# Patient Record
Sex: FEMALE | Race: WHITE | NOT HISPANIC OR LATINO | Employment: FULL TIME | URBAN - METROPOLITAN AREA
[De-identification: names, ages, dates, MRNs, and addresses within clinical notes are randomized per-mention and may not be internally consistent; named-entity substitution may affect disease eponyms.]

---

## 2017-01-19 ENCOUNTER — OFFICE VISIT (OUTPATIENT)
Dept: FAMILY MEDICINE CLINIC | Facility: CLINIC | Age: 42
End: 2017-01-19

## 2017-01-19 VITALS — HEART RATE: 102 BPM | DIASTOLIC BLOOD PRESSURE: 108 MMHG | SYSTOLIC BLOOD PRESSURE: 160 MMHG | OXYGEN SATURATION: 99 %

## 2017-01-19 DIAGNOSIS — L89.91 PRESSURE ULCER, STAGE I: ICD-10-CM

## 2017-01-19 DIAGNOSIS — L02.31 CUTANEOUS ABSCESS OF BUTTOCK: Primary | ICD-10-CM

## 2017-01-19 DIAGNOSIS — J06.9 VIRAL UPPER RESPIRATORY TRACT INFECTION: ICD-10-CM

## 2017-01-19 PROCEDURE — 99214 OFFICE O/P EST MOD 30 MIN: CPT | Performed by: NURSE PRACTITIONER

## 2017-01-19 RX ORDER — AMOXICILLIN 250 MG/1
250 CAPSULE ORAL 3 TIMES DAILY
COMMUNITY
End: 2017-03-09

## 2017-01-19 RX ORDER — SULFAMETHOXAZOLE AND TRIMETHOPRIM 800; 160 MG/1; MG/1
1 TABLET ORAL 2 TIMES DAILY
Qty: 20 TABLET | Refills: 0 | Status: SHIPPED | OUTPATIENT
Start: 2017-01-19 | End: 2017-02-08

## 2017-01-19 NOTE — PATIENT INSTRUCTIONS
Pressure Injury  A pressure injury, sometimes called a bedsore, is an injury to the skin and underlying tissue caused by pressure. Pressure on blood vessels causes decreased blood flow to the skin, which can eventually cause the skin tissue to die and break down into a wound.  Pressure injuries usually occur:  · Over bony parts of the body such as the tailbone, shoulders, elbows, hips, and heels.  · Under medical devices such as respiratory equipment, stockings, tubes, and splints.  Pressure injuries start as reddened areas on the skin and can lead to pain, muscle damage, and infection. Pressure injuries can vary in severity.   CAUSES  Pressure injuries are caused by a lack of blood supply to an area of skin. They can occur from intense pressure over a short period of time or from less intense pressure over a long period of time.  RISK FACTORS  This condition is more likely to develop in people who:  · Are in the hospital or an extended care facility.  · Are bedridden or in a wheelchair.  · Have an injury or disease that keeps them from:    Moving normally.    Feeling pain or pressure.  · Have a condition that:    Makes them sleepy or less alert.    Causes poor blood flow.  · Need to wear a medical device.  · Have poor control of their bladder or bowel functions (incontinence).  · Have poor nutrition (malnutrition).  · Are of certain ethnicities. People of  and  or  descent are at higher risk compared to other ethnic groups.  If you are at risk for pressure ulcers, your health care provider may recommend certain types of bedding to help prevent them. These may include foam or gel mattresses covered with one of the following:  · A sheepskin blanket.  · A pad that is filled with gel, air, water, or foam.  SYMPTOMS   The main symptom is a blister or change in skin color that opens into a wound. Other symptoms include:   · Red or dark areas of skin that do not turn white or pale when  pressed with a finger.    · Pain, warmth, or change of skin texture.    DIAGNOSIS  This condition is diagnosed with a medical history and physical exam. You may also have tests, including:   · Blood tests to check for infection or signs of poor nutrition.  · Imaging studies to check for damage to the deep tissues under your skin.  · Blood flow studies.  Your pressure injury will be staged to determine its severity. Staging is an assessment of:  · The depth of the pressure injury.  · Which tissues are exposed because of the pressure injury.  · The causes of the pressure injury.   TREATMENT  The main focus of treatment is to help your injury heal. This may be done by:   · Relieving or redistributing pressure on your skin. This includes:    Frequently changing your position.    Eliminating or minimizing positions that caused the wound or that can make the wound worse.    Using specific bed mattresses and chair cushions.    Refitting, resizing, or replacing any medical devices, or padding the skin under them.    Using creams or powders to prevent rubbing (friction) on the skin.  · Keeping your skin clean and dry. This may include using a skin cleanser or skin protectant as told by your health care provider. This may be a lotion, ointment, or spray.  · Cleaning your injury and removing any dead tissue from the wound (debridement).  · Placing a bandage (dressing) over your injury.  · Preventing or treating infection. This may include antibiotic, antimicrobial, or antiseptic medicines.  Treatment may also include medicine for pain.  Sometimes surgery is needed to close the wound with a flap of healthy skin or a piece of skin from another area of your body (graft). You may need surgery if other treatments are not working or if your injury is very deep.  HOME CARE INSTRUCTIONS  Wound Care  · Follow instructions from your health care provider about:    How to take care of your wound.    When and how you should change your  dressing.    When you should remove your dressing. If your dressing is dry and stuck when you try to remove it, moisten or wet the dressing with saline or water so that it can be removed without harming your skin or wound tissue.  · Check your wound every day for signs of infection. Have a caregiver do this for you if you are not able. Watch for:    More redness, swelling, or pain.    More fluid, blood, or pus.    A bad smell.  Skin Care  · Keep your skin clean and dry. Gently pat your skin dry.  · Do not rub or massage your skin.  · Use a skin protectant only as told by your health care provider.  · Check your skin every day for any changes in color or any new blisters or sores (ulcers). Have a caregiver do this for you if you are not able.  Medicines   · Take over-the-counter and prescription medicines only as told by your health care provider.  · If you were prescribed an antibiotic medicine, take it or apply it as told by your health care provider. Do not stop taking or using the antibiotic even if your condition improves.  Reducing and Redistributing Pressure  · Do not lie or sit in one position for a long time. Move or change position every two hours or as told by your health care provider.  · Use pillows or cushions to reduce pressure. Ask your health care provider to recommend cushions or pads for you.  · Use medical devices that do not rub your skin. Tell your health care provider if one of your medical devices is causing a pressure injury to develop.  General Instructions  · Eat a healthy diet that includes lots of protein. Ask your health care provider for diet advice.  · Drink enough fluid to keep your urine clear or pale yellow.  · Be as active as you can every day. Ask your health care provider to suggest safe exercises or activities.  · Do not abuse drugs or alcohol.  · Keep all follow-up visits as told by your health care provider. This is important.  · Do not smoke.  SEEK MEDICAL CARE IF:  · You  have chills or fever.  · Your pain medicine is not helping.  · You have any changes in skin color.  · You have new blisters or sores.  · You develop warmth, redness, or swelling near a pressure injury.  · You have a bad odor or pus coming from your pressure injury.  · You lose control of your bowels or bladder.  · You develop new symptoms.  · Your wound does not improve after 1-2 weeks of treatment.  · You develop a new medical condition, such as diabetes, peripheral vascular disease, or conditions that affect your defense (immune) system.     This information is not intended to replace advice given to you by your health care provider. Make sure you discuss any questions you have with your health care provider.     Document Released: 12/18/2006 Document Revised: 09/07/2016 Document Reviewed: 04/27/2016  [employer] Interactive Patient Education ©2016 [employer]

## 2017-01-19 NOTE — MR AVS SNAPSHOT
Rehana Cruz   1/19/2017 11:30 AM   Office Visit    Provider:  LEFTY Bustos   Department:  [facility]   Dept Phone:  [phone]                Your Full Care Plan              Today's Medication Changes          These changes are accurate as of: 1/19/17 11:43 AM.  If you have any questions, ask your nurse or doctor.               New Medication(s)Ordered:     sulfamethoxazole-trimethoprim 800-160 MG per tablet   Commonly known as:  BACTRIM DS,SEPTRA DS   Take 1 tablet by mouth 2 (Two) Times a Day.   Started by:  LEFTY Bustos         Medication(s)that have changed:     amoxicillin 250 MG capsule   Commonly known as:  AMOXIL   Take 250 mg by mouth 3 (Three) Times a Day.   What changed:  Another medication with the same name was removed. Continue taking this medication, and follow the directions you see here.   Changed by:  LEFTY Bustos            Where to Get Your Medications      These medications were sent to [facility] [address] [place] [phone] [place] [id number] [address] [phone]     Phone:  [phone]     sulfamethoxazole-trimethoprim 800-160 MG per tablet                  Your Updated Medication List          This list is accurate as of: 1/19/17 11:43 AM.  Always use your most recent med list.                amoxicillin 250 MG capsule   Commonly known as:  AMOXIL       norethindrone-ethinyl estradiol 1-20 MG-MCG per tablet   Commonly known as:  MICROGESTIN   Take 1 tablet by mouth daily.       oxybutynin XL 15 MG 24 hr tablet   Commonly known as:  DITROPAN XL       pimecrolimus 1 % cream   Commonly known as:  ELIDEL       sulfamethoxazole-trimethoprim 800-160 MG per tablet   Commonly known as:  BACTRIM DS,SEPTRA DS   Take 1 tablet by mouth 2 (Two) Times a Day.       venlafaxine  MG 24 hr capsule   Commonly known as:   EFFEXOR-XR   TAKE 1 CAPSULE BY MOUTH EVERY DAY               You Were Diagnosed With        Codes Comments    Cutaneous abscess of buttock    -  Primary ICD-10-CM: L02.31  ICD-9-CM: 682.5       Instructions     None    Patient Instructions History      [employer] Signup     [facility] [email or web address] allows you to send messages to your doctor, view your test results, renew your prescriptions, schedule appointments, and more. To sign up, go to [employer] and click on the Sign Up Now link in the New User? box. Enter your [email or web address] Activation Code exactly as it appears below along with the last four digits of your Social Security Number and your Date of Birth () to complete the sign-up process. If you do not sign up before the expiration date, you must request a new code.    [email or web address] Activation Code: [email or web address]  Expires: 2017 11:42 AM    If you have questions, you can email [email or web address] or call [phone] to talk to our [email or web address] staff. Remember, [email or web address] is NOT to be used for urgent needs. For medical emergencies, dial 911.               Other Info from Your Visit           Allergies     Latex  Rash      Reason for Visit     Wound Check     Nasal Congestion           Vital Signs     Blood Pressure Pulse Oxygen Saturation Smoking Status          160/108 102 99% Former Smoker        Problems and Diagnoses Noted     Abscess of buttock    -  Primary

## 2017-01-19 NOTE — PROGRESS NOTES
Subjective   Rehana Cruz is a 41 y.o. female.     History of Present Illness Patient presents with chest with nasal congestion that has been present for about 2 weeks. Pt has a hx of spina bifida and is a wheelchair.     Patient also states that she has a pressure sore on her buttocks that has been present for about a month. She states that she has some drainage. The area is getting smaller. Using Hibiclens.    The following portions of the patient's history were reviewed and updated as appropriate: allergies, current medications, past family history, past medical history, past social history, past surgical history and problem list.    Review of Systems   Respiratory: Positive for cough.        Objective   Physical Exam   Constitutional: She is oriented to person, place, and time. She appears well-developed and well-nourished. No distress.   HENT:   Head: Normocephalic and atraumatic.   Right Ear: External ear normal.   Left Ear: External ear normal.   Nose: Nose normal.   Mouth/Throat: Oropharynx is clear and moist. No oropharyngeal exudate.   Eyes: Conjunctivae and EOM are normal. Pupils are equal, round, and reactive to light.   Neck: Normal range of motion.   Cardiovascular: Normal rate and regular rhythm.    Pulmonary/Chest: Effort normal and breath sounds normal. No stridor. No respiratory distress. She has no wheezes.   Lymphadenopathy:     She has no cervical adenopathy.   Neurological: She is alert and oriented to person, place, and time.   Skin: Skin is warm and dry. Rash noted. There is erythema.   1cm erythematous pressure ulcer to left gluteal fold no drainage   Psychiatric: She has a normal mood and affect. Her behavior is normal.   Nursing note and vitals reviewed.      Assessment/Plan   Rehana was seen today for wound check and nasal congestion.    Diagnoses and all orders for this visit:    Cutaneous abscess of buttock  -     sulfamethoxazole-trimethoprim (BACTRIM DS,SEPTRA DS) 800-160 MG per  tablet; Take 1 tablet by mouth 2 (Two) Times a Day.    Viral upper respiratory tract infection    Pressure ulcer, stage I    Need to follow up for blood pressure monitoring

## 2017-01-22 ENCOUNTER — HOSPITAL ENCOUNTER (EMERGENCY)
Facility: HOSPITAL | Age: 42
Discharge: HOME | End: 2017-01-22
Attending: EMERGENCY MEDICINE | Admitting: EMERGENCY MEDICINE

## 2017-01-22 VITALS
OXYGEN SATURATION: 97 % | TEMPERATURE: 97.1 F | RESPIRATION RATE: 18 BRPM | HEART RATE: 89 BPM | SYSTOLIC BLOOD PRESSURE: 158 MMHG | HEIGHT: 60 IN | WEIGHT: 205 LBS | DIASTOLIC BLOOD PRESSURE: 71 MMHG | BODY MASS INDEX: 40.25 KG/M2

## 2017-01-22 DIAGNOSIS — J40 BRONCHITIS: Primary | ICD-10-CM

## 2017-01-22 DIAGNOSIS — I10 ESSENTIAL HYPERTENSION: ICD-10-CM

## 2017-01-22 PROCEDURE — 94760 N-INVAS EAR/PLS OXIMETRY 1: CPT

## 2017-01-22 PROCEDURE — 99283 EMERGENCY DEPT VISIT LOW MDM: CPT

## 2017-01-22 PROCEDURE — 94640 AIRWAY INHALATION TREATMENT: CPT

## 2017-01-22 PROCEDURE — 94799 UNLISTED PULMONARY SVC/PX: CPT

## 2017-01-22 RX ORDER — DOXYCYCLINE 100 MG/1
100 CAPSULE ORAL 2 TIMES DAILY
Qty: 20 CAPSULE | Refills: 0 | Status: SHIPPED | OUTPATIENT
Start: 2017-01-22 | End: 2017-03-09

## 2017-01-22 RX ORDER — IPRATROPIUM BROMIDE AND ALBUTEROL SULFATE 2.5; .5 MG/3ML; MG/3ML
3 SOLUTION RESPIRATORY (INHALATION) ONCE
Status: COMPLETED | OUTPATIENT
Start: 2017-01-22 | End: 2017-01-22

## 2017-01-22 RX ORDER — ALBUTEROL SULFATE 90 UG/1
2 AEROSOL, METERED RESPIRATORY (INHALATION) EVERY 4 HOURS PRN
Qty: 1 INHALER | Refills: 0 | Status: SHIPPED | OUTPATIENT
Start: 2017-01-22 | End: 2018-05-29

## 2017-01-22 RX ADMIN — IPRATROPIUM BROMIDE AND ALBUTEROL SULFATE 3 ML: .5; 3 SOLUTION RESPIRATORY (INHALATION) at 13:56

## 2017-01-22 NOTE — ED PROVIDER NOTES
" EMERGENCY DEPARTMENT ENCOUNTER    CHIEF COMPLAINT  Chief Complaint: URI Symptoms  History given by: Patient  History limited by: N/A  Room Number: 17/17  PMD: Rogelio De La Rosa MD      HPI:  Pt is a 41 y.o. female who presents complaining of URI symptoms. Pt was seen this morning at the [facility] when she experienced hypertension and was sent here for further evaluation. Pt states that her BP is normally around 160/80. Pt reports a cough productive of green sputum and SOA onset yesterday morning, nasal and chest congestion onset 2 weeks ago, mild rhinorrhea, and a sore throat secondary to coughing. Pt denies a fever, CP, abdominal pain, N/V/D, dysuria, and leg pain/swelling. Pt reports a hx of pneumonia, hypertension, and MRSA.    Duration:  2 weeks  Onset: Gradual  Timing: Constant  Location: N/A  Radiation: N/A  Quality: \"URI\"  Intensity/Severity: Moderate  Progression: Worsening  Associated Symptoms: Cough productive of green sputum, SOA, nasal and chest congestion, mild rhinorrhea, ans a sore throat  Aggravating Factors: None  Alleviating Factors: None  Previous Episodes: None  Treatment before arrival: None    PAST MEDICAL HISTORY  Active Ambulatory Problems     Diagnosis Date Noted   • Spina bifida    • Depression    • Neurogenic bladder    • Hemochromatosis      Resolved Ambulatory Problems     Diagnosis Date Noted   • No Resolved Ambulatory Problems     No Additional Past Medical History       PAST SURGICAL HISTORY  Past Surgical History   Procedure Laterality Date   • Skin graft Right      Buttock       FAMILY HISTORY  Family History   Problem Relation Age of Onset   • Ovarian cancer Mother      50's   • Multiple sclerosis Father    • Leukemia Paternal Uncle    • Alzheimer's disease Maternal Grandmother    • Leukemia Maternal Grandfather    • Hemochromatosis Brother        SOCIAL HISTORY  Social History     Social History   • Marital status: Single     Spouse name: N/A   • Number of children: N/A   • Years of " education: N/A     Occupational History   • Not on file.     Social History Main Topics   • Smoking status: Former Smoker     Packs/day: 0.50     Years: 20.00     Types: Cigarettes   • Smokeless tobacco: Never Used   • Alcohol use Yes      Comment: Socially.   • Drug use: No   • Sexual activity: No     Other Topics Concern   • Not on file     Social History Narrative       ALLERGIES  Latex    REVIEW OF SYSTEMS  Review of Systems   Constitutional: Negative.  Negative for unexpected weight change.   HENT: Positive for congestion (nasal and chest), rhinorrhea (mild) and sore throat (secondary to coughing).    Respiratory: Positive for cough (productive of green sputum) and shortness of breath.    Cardiovascular: Negative.    Gastrointestinal: Negative.    Genitourinary: Negative.    All other systems reviewed and are negative.      PHYSICAL EXAM  ED Triage Vitals   Temp Heart Rate Resp BP SpO2   01/22/17 1050 01/22/17 1050 01/22/17 1050 01/22/17 1106 01/22/17 1050   96.4 °F (35.8 °C) 93 18 160/92 94 %      Temp src Heart Rate Source Patient Position BP Location FiO2 (%)   01/22/17 1050 01/22/17 1050 -- 01/22/17 1106 --   Tympanic Monitor  Right arm        Physical Exam   Constitutional: She is oriented to person, place, and time and well-developed, well-nourished, and in no distress.   Eyes: EOM are normal.   Neck: Normal range of motion.   Cardiovascular: Normal rate and regular rhythm.    Pulmonary/Chest: Effort normal. No respiratory distress. She has wheezes (mild bilateral expiratory).   Neurological: She is alert and oriented to person, place, and time. She has normal sensation and normal strength.   Skin: Skin is warm and dry.   Psychiatric: Affect normal.   Nursing note and vitals reviewed.      LAB RESULTS  Lab Results (last 24 hours)     ** No results found for the last 24 hours. **          RADIOLOGY  No orders to display          PROCEDURES   Procedures      PROGRESS AND CONSULTS  ED Course     1:18  PM:  Vitals: BP: 162/82 HR: 93 Temp: 96.4 °F (35.8 °C) (Tympanic) O2 sat: 95%  Discussed with pt plan for Doxycycline and an albuterol inhaler. Pt will be discharged. Pt understands and agrees with the plan, all questions answered.      MEDICAL DECISION MAKING  Results were reviewed/discussed with the patient and they were also made aware of online access. Pt also made aware that some labs, such as cultures, will not be resulted during ER visit and follow up with PMD is necessary.     MDM       DIAGNOSIS  Final diagnoses:   Bronchitis   Essential hypertension       DISPOSITION  1328- DISCHARGE    Patient discharged in stable condition.    Reviewed implications of results, diagnosis, meds, responsibility to follow up, warning signs and symptoms of possible worsening, potential complications and reasons to return to ER.    Patient/Family voiced understanding of above instructions.    Discussed plan for discharge, as there is no emergent indication for admission.  Pt/family is agreeable and understands need for follow up and repeat testing.  Pt is aware that discharge does not mean that nothing is wrong but it indicates no emergency is present that requires admission and they must continue care with follow-up as given below or physician of their choice.     FOLLOW-UP  Rogelio De La Rosa MD  [address] [place] [id number] [phone]    In 1 week  If Not Better         Medication List      New Prescriptions          albuterol 108 (90 BASE) MCG/ACT inhaler   Commonly known as:  PROVENTIL HFA;VENTOLIN HFA   Inhale 2 puffs Every 4 (Four) Hours As Needed for wheezing.       doxycycline 100 MG capsule   Commonly known as:  MONODOX   Take 1 capsule by mouth 2 (Two) Times a Day.         Stop          amoxicillin 250 MG capsule   Commonly known as:  AMOXIL       sulfamethoxazole-trimethoprim 800-160 MG per tablet   Commonly known as:  BACTRIM DS,SEPTRA DS           Latest Documented Vital Signs:  As of 1:28 PM  BP-  162/82 HR- 93 Temp- 96.4 °F (35.8 °C) (Tympanic) O2 sat- 96%    --  Documentation assistance provided by joe Story for Indra Camargo MD.  Information recorded by the joe was done at my direction and has been verified and validated by me.       Hamilton Story  01/22/17 6488       Indra Camargo MD  01/22/17 3316

## 2017-01-24 ENCOUNTER — DOCUMENTATION (OUTPATIENT)
Dept: SOCIAL WORK | Facility: HOSPITAL | Age: 42
End: 2017-01-24

## 2017-02-08 ENCOUNTER — OFFICE VISIT (OUTPATIENT)
Dept: FAMILY MEDICINE CLINIC | Facility: CLINIC | Age: 42
End: 2017-02-08

## 2017-02-08 VITALS
OXYGEN SATURATION: 95 % | BODY MASS INDEX: 40.25 KG/M2 | WEIGHT: 205 LBS | HEART RATE: 94 BPM | RESPIRATION RATE: 20 BRPM | SYSTOLIC BLOOD PRESSURE: 140 MMHG | HEIGHT: 60 IN | DIASTOLIC BLOOD PRESSURE: 86 MMHG

## 2017-02-08 DIAGNOSIS — R06.2 WHEEZING: Primary | ICD-10-CM

## 2017-02-08 DIAGNOSIS — R05.9 COUGHING: ICD-10-CM

## 2017-02-08 PROCEDURE — 99213 OFFICE O/P EST LOW 20 MIN: CPT | Performed by: NURSE PRACTITIONER

## 2017-02-08 RX ORDER — METHYLPREDNISOLONE 4 MG/1
TABLET ORAL
Qty: 21 TABLET | Refills: 0 | Status: SHIPPED | OUTPATIENT
Start: 2017-02-08 | End: 2017-03-09

## 2017-02-08 RX ORDER — FLUTICASONE PROPIONATE 50 MCG
1 SPRAY, SUSPENSION (ML) NASAL
COMMUNITY
End: 2018-11-29 | Stop reason: SDUPTHER

## 2017-02-08 NOTE — PROGRESS NOTES
"Subjective   Rehana Cruz is a 41 y.o. female.     Chief Complaint   Patient presents with   • URI     x 2 weeks ago. Went to [facility] and dx with bronchitis.    • Shortness of Breath     Is still having all symptoms    • Cough     productive    • Nasal Congestion     States ears are clogged up     Social History   Substance Use Topics   • Smoking status: Former Smoker     Packs/day: 0.50     Years: 20.00     Types: Cigarettes   • Smokeless tobacco: Never Used   • Alcohol use Yes      Comment: Socially.       History of Present Illness     The following portions of the patient's history were reviewed and updated as appropriate: allergies, current medications, past social history and problem list.  Review of Systems   Constitutional: Negative for activity change and appetite change.   HENT: Positive for congestion and sinus pressure.    Respiratory: Positive for cough, shortness of breath and wheezing.    All other systems reviewed and are negative.      Objective   Vitals:    02/08/17 0941   BP: 140/86   Pulse: 94   Resp: 20   SpO2: 95%   Weight: 205 lb (93 kg)   Height: 60\" (152.4 cm)     Body mass index is 40.04 kg/(m^2).    Physical Exam   Constitutional: She appears well-developed and well-nourished. No distress.   HENT:   Head: Normocephalic.   Right Ear: External ear normal.   Left Ear: External ear normal.   Mouth/Throat: Oropharynx is clear and moist.   Eyes: EOM are normal.   Neck: Neck supple.   Cardiovascular: Normal rate and regular rhythm.    Pulmonary/Chest: Effort normal. She has wheezes.   Inspiratory and expiratory wheezes all fields   Neurological: She is alert.   Nursing note and vitals reviewed.      Assessment/Plan   Problem List Items Addressed This Visit     None      Visit Diagnoses     Wheezing    -  Primary    Coughing             Medrol dose pack as written, use nebulizer 4-6 puffs every 4 hours for the next 24 hours  "

## 2017-03-09 ENCOUNTER — OFFICE VISIT (OUTPATIENT)
Dept: FAMILY MEDICINE CLINIC | Facility: CLINIC | Age: 42
End: 2017-03-09

## 2017-03-09 VITALS
HEIGHT: 60 IN | HEART RATE: 82 BPM | OXYGEN SATURATION: 98 % | DIASTOLIC BLOOD PRESSURE: 80 MMHG | SYSTOLIC BLOOD PRESSURE: 145 MMHG

## 2017-03-09 DIAGNOSIS — I10 ESSENTIAL HYPERTENSION: ICD-10-CM

## 2017-03-09 DIAGNOSIS — I10 ESSENTIAL HYPERTENSION: Primary | ICD-10-CM

## 2017-03-09 PROCEDURE — 99213 OFFICE O/P EST LOW 20 MIN: CPT | Performed by: FAMILY MEDICINE

## 2017-03-09 RX ORDER — AMOXICILLIN 250 MG/1
250 CAPSULE ORAL DAILY
COMMUNITY
End: 2017-05-17

## 2017-03-09 RX ORDER — INDAPAMIDE 2.5 MG/1
2.5 TABLET, FILM COATED ORAL EVERY MORNING
Qty: 30 TABLET | Refills: 6 | Status: SHIPPED | OUTPATIENT
Start: 2017-03-09 | End: 2017-03-09 | Stop reason: SDUPTHER

## 2017-03-09 NOTE — PROGRESS NOTES
Subjective   Rehana Cruz is a 41 y.o. female here to discuss elevated BP.    History of Present Illness   Rheana is concerned her birth control is effecting her BP. She has been monitoring her BP on occasion; averaging 150/80-90's. She denies CP, SOB, vision disturbances, or HA..  She feels that she needs the OCP because of a hx of heavy menses so she would like to stay on this and treat the HTN.    The following portions of the patient's history were reviewed and updated as appropriate: allergies, current medications, past medical history and past social history.    Review of Systems   Respiratory: Negative for chest tightness and shortness of breath.    Cardiovascular: Positive for leg swelling. Negative for chest pain and palpitations.   Neurological: Negative for dizziness and headaches.       Objective   Physical Exam   Constitutional: She is oriented to person, place, and time.   Cardiovascular: Normal rate and regular rhythm.    Pulmonary/Chest: Effort normal and breath sounds normal.   Neurological: She is alert and oriented to person, place, and time.   Skin: No rash noted.   Nursing note and vitals reviewed.      Assessment/Plan   Rehana was seen today for hypertension.    Diagnoses and all orders for this visit:    Essential hypertension  -     indapamide (LOZOL) 2.5 MG tablet; Take 1 tablet by mouth Every Morning.    She will f/u with me in 2 weeks for repeat B/P check and CMP.   I have also advised a daily baby aspirin.

## 2017-03-09 NOTE — PATIENT INSTRUCTIONS
I have started you indapamide 2.5 mg to see if your blood pressure responds well to this.  I would try a daily baby aspirin.

## 2017-03-10 RX ORDER — INDAPAMIDE 2.5 MG/1
TABLET, FILM COATED ORAL
Qty: 90 TABLET | Refills: 1 | Status: SHIPPED | OUTPATIENT
Start: 2017-03-10 | End: 2017-08-04 | Stop reason: SDUPTHER

## 2017-03-22 ENCOUNTER — OFFICE VISIT (OUTPATIENT)
Dept: FAMILY MEDICINE CLINIC | Facility: CLINIC | Age: 42
End: 2017-03-22

## 2017-03-22 VITALS — SYSTOLIC BLOOD PRESSURE: 145 MMHG | DIASTOLIC BLOOD PRESSURE: 80 MMHG | HEART RATE: 90 BPM | OXYGEN SATURATION: 98 %

## 2017-03-22 DIAGNOSIS — F41.9 ANXIETY: ICD-10-CM

## 2017-03-22 DIAGNOSIS — I10 ESSENTIAL HYPERTENSION: ICD-10-CM

## 2017-03-22 DIAGNOSIS — F32.89 OTHER DEPRESSION: ICD-10-CM

## 2017-03-22 DIAGNOSIS — I10 ESSENTIAL HYPERTENSION: Primary | ICD-10-CM

## 2017-03-22 PROCEDURE — 99213 OFFICE O/P EST LOW 20 MIN: CPT | Performed by: FAMILY MEDICINE

## 2017-03-22 RX ORDER — VENLAFAXINE 75 MG/1
150 TABLET ORAL 2 TIMES DAILY
Qty: 120 TABLET | Refills: 3 | Status: SHIPPED | OUTPATIENT
Start: 2017-03-22 | End: 2017-08-04 | Stop reason: SDUPTHER

## 2017-03-22 RX ORDER — AMLODIPINE BESYLATE 5 MG/1
5 TABLET ORAL DAILY
Qty: 30 TABLET | Refills: 3 | Status: SHIPPED | OUTPATIENT
Start: 2017-03-22 | End: 2017-03-22 | Stop reason: SDUPTHER

## 2017-03-22 RX ORDER — AMLODIPINE BESYLATE 5 MG/1
TABLET ORAL
Qty: 90 TABLET | Refills: 0 | Status: SHIPPED | OUTPATIENT
Start: 2017-03-22 | End: 2017-06-29 | Stop reason: SDUPTHER

## 2017-03-22 NOTE — PROGRESS NOTES
Subjective   Rehana Cruz is a 41 y.o. female here for 2wk. re-evaluation for HTN.    History of Present Illness   She has been taking indapamide as prescribed with no adverse effects.  She has npo c/o headache or leg swelling. She has not increased her activity much.  She would also like to discuss her antidepressant. It works well for her but her current generic fore is very expensive and she would like to try the immediate release form to see if that works because it is much less expensive.    The following portions of the patient's history were reviewed and updated as appropriate: allergies, current medications, past family history, past medical history, past social history, past surgical history and problem list.    Review of Systems   Respiratory: Negative for chest tightness and shortness of breath.    Cardiovascular: Negative for chest pain and palpitations.       Objective   Physical Exam   Constitutional: She appears well-developed.   In a wheelchair.   Cardiovascular: Normal rate and regular rhythm.    Pulmonary/Chest: Breath sounds normal.   Psychiatric: She has a normal mood and affect. Her behavior is normal. Judgment and thought content normal.   Nursing note and vitals reviewed.      Assessment/Plan   Rehana was seen today for hypertension.    Diagnoses and all orders for this visit:    Essential hypertension  -    I have added Norvasc to indapamide to see if this helps  ': amLODIPine (NORVASC) 5 MG tablet; Take 1 tablet by mouth Daily.  She will RTO in 2 weeks for a B/P check.  Anxiety  Other depression  I have changed to short release and she will see if this works for her.  -     venlafaxine (EFFEXOR) 75 MG tablet; Take 2 tablets by mouth 2 (Two) Times a Day.

## 2017-04-18 ENCOUNTER — OFFICE VISIT (OUTPATIENT)
Dept: FAMILY MEDICINE CLINIC | Facility: CLINIC | Age: 42
End: 2017-04-18

## 2017-04-18 VITALS — OXYGEN SATURATION: 98 % | SYSTOLIC BLOOD PRESSURE: 115 MMHG | DIASTOLIC BLOOD PRESSURE: 70 MMHG | HEART RATE: 72 BPM

## 2017-04-18 DIAGNOSIS — R35.0 URINARY FREQUENCY: ICD-10-CM

## 2017-04-18 DIAGNOSIS — I10 ESSENTIAL HYPERTENSION: ICD-10-CM

## 2017-04-18 DIAGNOSIS — R82.90 ABNORMAL URINE ODOR: Primary | ICD-10-CM

## 2017-04-18 LAB
BILIRUB BLD-MCNC: NEGATIVE MG/DL
CLARITY, POC: CLEAR
COLOR UR: YELLOW
GLUCOSE UR STRIP-MCNC: NEGATIVE MG/DL
KETONES UR QL: ABNORMAL
LEUKOCYTE EST, POC: NEGATIVE
NITRITE UR-MCNC: NEGATIVE MG/ML
PH UR: 7 [PH] (ref 5–8)
PROT UR STRIP-MCNC: ABNORMAL MG/DL
RBC # UR STRIP: NEGATIVE /UL
SP GR UR: 1 (ref 1–1.03)
UROBILINOGEN UR QL: NORMAL

## 2017-04-18 PROCEDURE — 81002 URINALYSIS NONAUTO W/O SCOPE: CPT | Performed by: FAMILY MEDICINE

## 2017-04-18 PROCEDURE — 99213 OFFICE O/P EST LOW 20 MIN: CPT | Performed by: FAMILY MEDICINE

## 2017-04-18 NOTE — PROGRESS NOTES
Subjective   Rehana Cruz is a 41 y.o. female here to re-evaluate HTN and UTI.    History of Present Illness   Rehana has a hx of chronic hypertension that was not well controlled until recently.  She has been taking amlodipine and indapamide and she is tolerating well.  She has no side effecst. She is feeling well.  She has had a few days of pelvic discomfort and has a hx of frequent UTIs. She is concerned about the odor.She has spina bifida and this increasing her risk for UTI.  She has not noticed frequency or blood.   The following portions of the patient's history were reviewed and updated as appropriate: allergies, current medications, past medical history and past social history.    Review of Systems   Gastrointestinal: Positive for abdominal pain.   Genitourinary: Positive for dysuria, frequency and urgency.       Objective   Physical Exam   Constitutional: She appears well-developed.   HENT:   Head: Normocephalic and atraumatic.   Eyes: EOM are normal. Pupils are equal, round, and reactive to light.   Cardiovascular: Normal rate and regular rhythm.    Pulmonary/Chest: Effort normal.   Abdominal: Soft. She exhibits no distension.   Neurological: She is alert.   Vitals reviewed.      Assessment/Plan   Rehana was seen today for hypertension and urinary tract infection.    Diagnoses and all orders for this visit:  Urinary Frequency  Abnormal urine odor  -     POCT urinalysis dipstick, manual, this was negative and I have advised monitoring for now.    Essential hypertension  Well controlled on current medication.  She will f/u with me in a month or so for a preventive exam.

## 2017-05-03 ENCOUNTER — TELEPHONE (OUTPATIENT)
Dept: FAMILY MEDICINE CLINIC | Facility: CLINIC | Age: 42
End: 2017-05-03

## 2017-05-17 ENCOUNTER — OFFICE VISIT (OUTPATIENT)
Dept: FAMILY MEDICINE CLINIC | Facility: CLINIC | Age: 42
End: 2017-05-17

## 2017-05-17 VITALS
HEART RATE: 89 BPM | HEIGHT: 61 IN | RESPIRATION RATE: 16 BRPM | SYSTOLIC BLOOD PRESSURE: 120 MMHG | DIASTOLIC BLOOD PRESSURE: 74 MMHG | OXYGEN SATURATION: 98 %

## 2017-05-17 DIAGNOSIS — F32.89 OTHER DEPRESSION: ICD-10-CM

## 2017-05-17 DIAGNOSIS — E83.110 HEREDITARY HEMOCHROMATOSIS (HCC): ICD-10-CM

## 2017-05-17 DIAGNOSIS — Q05.7 SPINA BIFIDA OF LUMBOSACRAL REGION WITHOUT HYDROCEPHALUS (HCC): ICD-10-CM

## 2017-05-17 DIAGNOSIS — B35.1 TOENAIL FUNGUS: ICD-10-CM

## 2017-05-17 DIAGNOSIS — I10 ESSENTIAL HYPERTENSION: ICD-10-CM

## 2017-05-17 DIAGNOSIS — Z00.00 ROUTINE GENERAL MEDICAL EXAMINATION AT A HEALTH CARE FACILITY: Primary | ICD-10-CM

## 2017-05-17 LAB
ALBUMIN SERPL-MCNC: 4.2 G/DL (ref 3.5–5.2)
ALBUMIN/GLOB SERPL: 1.4 G/DL
ALP SERPL-CCNC: 96 U/L (ref 39–117)
ALT SERPL-CCNC: 25 U/L (ref 1–33)
AST SERPL-CCNC: 20 U/L (ref 1–32)
BILIRUB SERPL-MCNC: 0.3 MG/DL (ref 0.1–1.2)
BUN SERPL-MCNC: 17 MG/DL (ref 6–20)
BUN/CREAT SERPL: 39.5 (ref 7–25)
CALCIUM SERPL-MCNC: 10 MG/DL (ref 8.6–10.5)
CHLORIDE SERPL-SCNC: 95 MMOL/L (ref 98–107)
CHOLEST SERPL-MCNC: 258 MG/DL (ref 0–200)
CHOLEST/HDLC SERPL: 4.61 {RATIO}
CO2 SERPL-SCNC: 28.1 MMOL/L (ref 22–29)
CREAT SERPL-MCNC: 0.43 MG/DL (ref 0.57–1)
ERYTHROCYTE [DISTWIDTH] IN BLOOD BY AUTOMATED COUNT: 12.6 % (ref 11.7–13)
GLOBULIN SER CALC-MCNC: 3.1 GM/DL
GLUCOSE SERPL-MCNC: 97 MG/DL (ref 65–99)
HCT VFR BLD AUTO: 41.1 % (ref 35.6–45.5)
HDLC SERPL-MCNC: 56 MG/DL (ref 40–60)
HGB BLD-MCNC: 14.3 G/DL (ref 11.9–15.5)
LDLC SERPL CALC-MCNC: 172 MG/DL (ref 0–100)
MCH RBC QN AUTO: 31.3 PG (ref 26.9–32)
MCHC RBC AUTO-ENTMCNC: 34.8 G/DL (ref 32.4–36.3)
MCV RBC AUTO: 89.9 FL (ref 80.5–98.2)
PLATELET # BLD AUTO: 356 10*3/MM3 (ref 140–500)
POTASSIUM SERPL-SCNC: 3.2 MMOL/L (ref 3.5–5.2)
PROT SERPL-MCNC: 7.3 G/DL (ref 6–8.5)
RBC # BLD AUTO: 4.57 10*6/MM3 (ref 3.9–5.2)
SODIUM SERPL-SCNC: 141 MMOL/L (ref 136–145)
TRIGL SERPL-MCNC: 150 MG/DL (ref 0–150)
VLDLC SERPL CALC-MCNC: 30 MG/DL (ref 5–40)
WBC # BLD AUTO: 13.19 10*3/MM3 (ref 4.5–10.7)

## 2017-05-17 PROCEDURE — 96160 PT-FOCUSED HLTH RISK ASSMT: CPT | Performed by: FAMILY MEDICINE

## 2017-05-17 PROCEDURE — 99396 PREV VISIT EST AGE 40-64: CPT | Performed by: FAMILY MEDICINE

## 2017-05-17 PROCEDURE — G0439 PPPS, SUBSEQ VISIT: HCPCS | Performed by: FAMILY MEDICINE

## 2017-05-17 PROCEDURE — 99213 OFFICE O/P EST LOW 20 MIN: CPT | Performed by: FAMILY MEDICINE

## 2017-05-24 DIAGNOSIS — E87.6 LOW BLOOD POTASSIUM: ICD-10-CM

## 2017-05-24 DIAGNOSIS — D72.829 LEUKOCYTOSIS, UNSPECIFIED TYPE: Primary | ICD-10-CM

## 2017-05-29 ENCOUNTER — RESULTS ENCOUNTER (OUTPATIENT)
Dept: FAMILY MEDICINE CLINIC | Facility: CLINIC | Age: 42
End: 2017-05-29

## 2017-05-29 DIAGNOSIS — D72.829 LEUKOCYTOSIS, UNSPECIFIED TYPE: ICD-10-CM

## 2017-05-29 DIAGNOSIS — E87.6 LOW BLOOD POTASSIUM: ICD-10-CM

## 2017-06-04 DIAGNOSIS — N92.1 MENOMETRORRHAGIA: ICD-10-CM

## 2017-06-05 RX ORDER — ESTAZOLAM 2 MG/1
TABLET ORAL
Qty: 21 TABLET | Refills: 11 | Status: SHIPPED | OUTPATIENT
Start: 2017-06-05 | End: 2018-06-14 | Stop reason: SDUPTHER

## 2017-06-29 DIAGNOSIS — I10 ESSENTIAL HYPERTENSION: ICD-10-CM

## 2017-06-29 RX ORDER — AMLODIPINE BESYLATE 5 MG/1
TABLET ORAL
Qty: 90 TABLET | Refills: 0 | Status: SHIPPED | OUTPATIENT
Start: 2017-06-29 | End: 2017-08-04 | Stop reason: SDUPTHER

## 2017-07-24 ENCOUNTER — TELEPHONE (OUTPATIENT)
Dept: OBSTETRICS AND GYNECOLOGY | Facility: CLINIC | Age: 42
End: 2017-07-24

## 2017-08-04 DIAGNOSIS — F32.89 OTHER DEPRESSION: ICD-10-CM

## 2017-08-04 DIAGNOSIS — I10 ESSENTIAL HYPERTENSION: ICD-10-CM

## 2017-08-04 RX ORDER — INDAPAMIDE 2.5 MG/1
TABLET, FILM COATED ORAL
Qty: 90 TABLET | Refills: 0 | Status: SHIPPED | OUTPATIENT
Start: 2017-08-04 | End: 2017-09-18 | Stop reason: SDUPTHER

## 2017-08-04 RX ORDER — INDAPAMIDE 2.5 MG/1
TABLET, FILM COATED ORAL
Qty: 90 TABLET | Refills: 0 | Status: SHIPPED | OUTPATIENT
Start: 2017-08-04 | End: 2019-02-22

## 2017-08-04 RX ORDER — VENLAFAXINE 75 MG/1
TABLET ORAL
Qty: 120 TABLET | Refills: 0 | Status: SHIPPED | OUTPATIENT
Start: 2017-08-04 | End: 2019-01-29 | Stop reason: SDUPTHER

## 2017-08-04 RX ORDER — AMLODIPINE BESYLATE 5 MG/1
TABLET ORAL
Qty: 90 TABLET | Refills: 0 | Status: SHIPPED | OUTPATIENT
Start: 2017-08-04 | End: 2018-04-28 | Stop reason: SDUPTHER

## 2017-08-04 RX ORDER — AMLODIPINE BESYLATE 5 MG/1
TABLET ORAL
Qty: 90 TABLET | Refills: 0 | Status: SHIPPED | OUTPATIENT
Start: 2017-08-04 | End: 2017-09-18 | Stop reason: SDUPTHER

## 2017-08-04 RX ORDER — VENLAFAXINE 75 MG/1
TABLET ORAL
Qty: 120 TABLET | Refills: 0 | Status: SHIPPED | OUTPATIENT
Start: 2017-08-04 | End: 2017-09-18 | Stop reason: SDUPTHER

## 2017-09-18 ENCOUNTER — OFFICE VISIT (OUTPATIENT)
Dept: FAMILY MEDICINE CLINIC | Facility: CLINIC | Age: 42
End: 2017-09-18

## 2017-09-18 VITALS
HEART RATE: 103 BPM | DIASTOLIC BLOOD PRESSURE: 86 MMHG | SYSTOLIC BLOOD PRESSURE: 128 MMHG | HEIGHT: 60 IN | OXYGEN SATURATION: 96 %

## 2017-09-18 DIAGNOSIS — H61.23 BILATERAL IMPACTED CERUMEN: ICD-10-CM

## 2017-09-18 DIAGNOSIS — R89.9 ABNORMAL LABORATORY TEST RESULT: ICD-10-CM

## 2017-09-18 DIAGNOSIS — J01.20 ACUTE NON-RECURRENT ETHMOIDAL SINUSITIS: Primary | ICD-10-CM

## 2017-09-18 PROCEDURE — 99213 OFFICE O/P EST LOW 20 MIN: CPT | Performed by: NURSE PRACTITIONER

## 2017-09-18 RX ORDER — SULFAMETHOXAZOLE AND TRIMETHOPRIM 200; 40 MG/5ML; MG/5ML
SUSPENSION ORAL DAILY
COMMUNITY
End: 2018-05-29 | Stop reason: SDUPTHER

## 2017-09-18 RX ORDER — AMOXICILLIN 875 MG/1
875 TABLET, COATED ORAL 2 TIMES DAILY
Qty: 20 TABLET | Refills: 0 | Status: SHIPPED | OUTPATIENT
Start: 2017-09-18 | End: 2017-09-18 | Stop reason: SDUPTHER

## 2017-09-18 RX ORDER — AMOXICILLIN 875 MG/1
875 TABLET, COATED ORAL 2 TIMES DAILY
Qty: 20 TABLET | Refills: 0 | Status: SHIPPED | OUTPATIENT
Start: 2017-09-18 | End: 2018-05-29

## 2017-09-19 ENCOUNTER — TELEPHONE (OUTPATIENT)
Dept: FAMILY MEDICINE CLINIC | Facility: CLINIC | Age: 42
End: 2017-09-19

## 2017-09-19 LAB
ALBUMIN SERPL-MCNC: 4.4 G/DL (ref 3.5–5.5)
ALBUMIN/GLOB SERPL: 1.6 {RATIO} (ref 1.2–2.2)
ALP SERPL-CCNC: 99 IU/L (ref 39–117)
ALT SERPL-CCNC: 13 IU/L (ref 0–32)
AST SERPL-CCNC: 11 IU/L (ref 0–40)
BASOPHILS # BLD AUTO: 0 X10E3/UL (ref 0–0.2)
BASOPHILS NFR BLD AUTO: 0 %
BILIRUB SERPL-MCNC: 0.3 MG/DL (ref 0–1.2)
BUN SERPL-MCNC: 15 MG/DL (ref 6–24)
BUN/CREAT SERPL: 28 (ref 9–23)
CALCIUM SERPL-MCNC: 10 MG/DL (ref 8.7–10.2)
CHLORIDE SERPL-SCNC: 96 MMOL/L (ref 96–106)
CO2 SERPL-SCNC: 26 MMOL/L (ref 18–29)
CREAT SERPL-MCNC: 0.54 MG/DL (ref 0.57–1)
EOSINOPHIL # BLD AUTO: 0.4 X10E3/UL (ref 0–0.4)
EOSINOPHIL NFR BLD AUTO: 3 %
ERYTHROCYTE [DISTWIDTH] IN BLOOD BY AUTOMATED COUNT: 12.6 % (ref 12.3–15.4)
GLOBULIN SER CALC-MCNC: 2.8 G/DL (ref 1.5–4.5)
GLUCOSE SERPL-MCNC: 103 MG/DL (ref 65–99)
HCT VFR BLD AUTO: 42.8 % (ref 34–46.6)
HGB BLD-MCNC: 14.8 G/DL (ref 11.1–15.9)
IMM GRANULOCYTES # BLD: 0 X10E3/UL (ref 0–0.1)
IMM GRANULOCYTES NFR BLD: 0 %
LYMPHOCYTES # BLD AUTO: 2.6 X10E3/UL (ref 0.7–3.1)
LYMPHOCYTES NFR BLD AUTO: 21 %
MCH RBC QN AUTO: 31.5 PG (ref 26.6–33)
MCHC RBC AUTO-ENTMCNC: 34.6 G/DL (ref 31.5–35.7)
MCV RBC AUTO: 91 FL (ref 79–97)
MONOCYTES # BLD AUTO: 0.5 X10E3/UL (ref 0.1–0.9)
MONOCYTES NFR BLD AUTO: 4 %
NEUTROPHILS # BLD AUTO: 8.7 X10E3/UL (ref 1.4–7)
NEUTROPHILS NFR BLD AUTO: 72 %
PLATELET # BLD AUTO: 357 X10E3/UL (ref 150–379)
POTASSIUM SERPL-SCNC: 3.2 MMOL/L (ref 3.5–5.2)
PROT SERPL-MCNC: 7.2 G/DL (ref 6–8.5)
RBC # BLD AUTO: 4.7 X10E6/UL (ref 3.77–5.28)
SODIUM SERPL-SCNC: 143 MMOL/L (ref 134–144)
WBC # BLD AUTO: 12.2 X10E3/UL (ref 3.4–10.8)

## 2017-09-19 NOTE — TELEPHONE ENCOUNTER
----- Message from Suzan Miller sent at 9/18/2017  3:47 PM EDT -----  Patient was in office today and is asking for refill on Elidel cream asking to have it called to [phone]

## 2017-09-25 RX ORDER — PIMECROLIMUS 10 MG/G
CREAM TOPICAL 2 TIMES DAILY
Qty: 60 G | Refills: 3 | Status: SHIPPED | OUTPATIENT
Start: 2017-09-25 | End: 2018-05-29 | Stop reason: SDUPTHER

## 2017-09-28 ENCOUNTER — RESULTS ENCOUNTER (OUTPATIENT)
Dept: FAMILY MEDICINE CLINIC | Facility: CLINIC | Age: 42
End: 2017-09-28

## 2017-09-28 DIAGNOSIS — E87.6 LOW BLOOD POTASSIUM: ICD-10-CM

## 2017-09-28 DIAGNOSIS — E87.6 LOW BLOOD POTASSIUM: Primary | ICD-10-CM

## 2017-10-02 LAB
ERYTHROCYTE [DISTWIDTH] IN BLOOD BY AUTOMATED COUNT: 13 % (ref 11.7–13)
HCT VFR BLD AUTO: 42.3 % (ref 35.6–45.5)
HGB BLD-MCNC: 14.5 G/DL (ref 11.9–15.5)
MCH RBC QN AUTO: 31.9 PG (ref 26.9–32)
MCHC RBC AUTO-ENTMCNC: 34.3 G/DL (ref 32.4–36.3)
MCV RBC AUTO: 93.2 FL (ref 80.5–98.2)
PLATELET # BLD AUTO: 388 10*3/MM3 (ref 140–500)
RBC # BLD AUTO: 4.54 10*6/MM3 (ref 3.9–5.2)
WBC # BLD AUTO: 10.47 10*3/MM3 (ref 4.5–10.7)

## 2017-10-03 LAB
ALBUMIN SERPL-MCNC: 4.4 G/DL (ref 3.5–5.2)
ALBUMIN/GLOB SERPL: 1.4 G/DL
ALP SERPL-CCNC: 96 U/L (ref 39–117)
ALT SERPL-CCNC: 13 U/L (ref 1–33)
AST SERPL-CCNC: 21 U/L (ref 1–32)
BILIRUB SERPL-MCNC: 0.3 MG/DL (ref 0.1–1.2)
BUN SERPL-MCNC: 15 MG/DL (ref 6–20)
BUN/CREAT SERPL: 26.3 (ref 7–25)
CALCIUM SERPL-MCNC: 9.8 MG/DL (ref 8.6–10.5)
CHLORIDE SERPL-SCNC: 97 MMOL/L (ref 98–107)
CO2 SERPL-SCNC: 27.8 MMOL/L (ref 22–29)
CREAT SERPL-MCNC: 0.57 MG/DL (ref 0.57–1)
GLOBULIN SER CALC-MCNC: 3.1 GM/DL
GLUCOSE SERPL-MCNC: 135 MG/DL (ref 65–99)
POTASSIUM SERPL-SCNC: 3.3 MMOL/L (ref 3.5–5.2)
PROT SERPL-MCNC: 7.5 G/DL (ref 6–8.5)
SODIUM SERPL-SCNC: 142 MMOL/L (ref 136–145)

## 2017-10-04 DIAGNOSIS — F32.89 OTHER DEPRESSION: ICD-10-CM

## 2017-10-04 RX ORDER — VENLAFAXINE 75 MG/1
TABLET ORAL
Qty: 120 TABLET | Refills: 0 | Status: SHIPPED | OUTPATIENT
Start: 2017-10-04 | End: 2018-05-29 | Stop reason: SDUPTHER

## 2017-10-05 ENCOUNTER — TELEPHONE (OUTPATIENT)
Dept: FAMILY MEDICINE CLINIC | Facility: CLINIC | Age: 42
End: 2017-10-05

## 2017-10-05 DIAGNOSIS — E87.6 HYPOKALEMIA: Primary | ICD-10-CM

## 2017-10-05 RX ORDER — POTASSIUM CHLORIDE 750 MG/1
10 TABLET, FILM COATED, EXTENDED RELEASE ORAL DAILY
Qty: 30 TABLET | Refills: 0 | Status: SHIPPED | OUTPATIENT
Start: 2017-10-05 | End: 2018-05-29

## 2017-10-05 NOTE — TELEPHONE ENCOUNTER
No potassium supplement. She started taking Ditropan 2 months ago. No muscle cramps or heart palpitations.     She was not fasting.

## 2017-10-05 NOTE — TELEPHONE ENCOUNTER
Gerard Ms. Cruz,     My name is Dr. Tyler, I am one of Dr. De La Rosa' colleagues and I am covering for her while she is on vacation.       Your recent lab tests came back with a slightly low potassium level.  Do you take a potassium supplement, and if so at what dose?  Have you started any new medications?  Do you have any symptoms such as muscle cramps or heart palpitations?     Your blood sugar was also a bit high.  Were you fasting for the blood tests on 10/2/17?     I hope you are well!       Sincerely,   Dr. Tyler     [facility], 10/5

## 2017-10-05 NOTE — TELEPHONE ENCOUNTER
Ok thanks!  Could you call her back and ask her to start taking 10 mEq of potassium chloride (over the counter) and follow up for a repeat BMP in 1 week?

## 2017-10-10 ENCOUNTER — RESULTS ENCOUNTER (OUTPATIENT)
Dept: FAMILY MEDICINE CLINIC | Facility: CLINIC | Age: 42
End: 2017-10-10

## 2017-10-10 DIAGNOSIS — E87.6 HYPOKALEMIA: ICD-10-CM

## 2017-11-05 DIAGNOSIS — F32.89 OTHER DEPRESSION: ICD-10-CM

## 2017-11-06 RX ORDER — VENLAFAXINE 75 MG/1
TABLET ORAL
Qty: 120 TABLET | Refills: 0 | Status: SHIPPED | OUTPATIENT
Start: 2017-11-06 | End: 2018-05-29 | Stop reason: SDUPTHER

## 2017-12-19 DIAGNOSIS — F32.89 OTHER DEPRESSION: ICD-10-CM

## 2017-12-19 RX ORDER — VENLAFAXINE 75 MG/1
TABLET ORAL
Qty: 120 TABLET | Refills: 0 | Status: SHIPPED | OUTPATIENT
Start: 2017-12-19 | End: 2018-05-29 | Stop reason: SDUPTHER

## 2018-01-22 DIAGNOSIS — F32.89 OTHER DEPRESSION: ICD-10-CM

## 2018-01-22 RX ORDER — VENLAFAXINE 75 MG/1
TABLET ORAL
Qty: 120 TABLET | Refills: 0 | Status: SHIPPED | OUTPATIENT
Start: 2018-01-22 | End: 2018-05-29 | Stop reason: SDUPTHER

## 2018-02-18 DIAGNOSIS — F32.89 OTHER DEPRESSION: ICD-10-CM

## 2018-02-19 RX ORDER — VENLAFAXINE 75 MG/1
TABLET ORAL
Qty: 120 TABLET | Refills: 5 | Status: SHIPPED | OUTPATIENT
Start: 2018-02-19 | End: 2018-05-29 | Stop reason: SDUPTHER

## 2018-04-28 DIAGNOSIS — I10 ESSENTIAL HYPERTENSION: ICD-10-CM

## 2018-04-30 RX ORDER — AMLODIPINE BESYLATE 5 MG/1
TABLET ORAL
Qty: 90 TABLET | Refills: 0 | Status: SHIPPED | OUTPATIENT
Start: 2018-04-30 | End: 2018-09-20 | Stop reason: SDUPTHER

## 2018-04-30 RX ORDER — INDAPAMIDE 2.5 MG/1
TABLET, FILM COATED ORAL
Qty: 90 TABLET | Refills: 0 | Status: SHIPPED | OUTPATIENT
Start: 2018-04-30 | End: 2018-05-29 | Stop reason: SDUPTHER

## 2018-05-29 ENCOUNTER — OFFICE VISIT (OUTPATIENT)
Dept: FAMILY MEDICINE CLINIC | Facility: CLINIC | Age: 43
End: 2018-05-29

## 2018-05-29 VITALS
OXYGEN SATURATION: 97 % | WEIGHT: 205 LBS | HEART RATE: 96 BPM | RESPIRATION RATE: 16 BRPM | HEIGHT: 60 IN | BODY MASS INDEX: 40.25 KG/M2

## 2018-05-29 DIAGNOSIS — H65.93 BILATERAL NON-SUPPURATIVE OTITIS MEDIA: Primary | ICD-10-CM

## 2018-05-29 DIAGNOSIS — H61.23 IMPACTED CERUMEN OF BOTH EARS: ICD-10-CM

## 2018-05-29 PROCEDURE — 99213 OFFICE O/P EST LOW 20 MIN: CPT | Performed by: FAMILY MEDICINE

## 2018-05-29 RX ORDER — SULFAMETHOXAZOLE AND TRIMETHOPRIM 400; 80 MG/1; MG/1
TABLET ORAL
Refills: 11 | COMMUNITY
Start: 2018-05-21 | End: 2019-09-06

## 2018-05-29 NOTE — PROGRESS NOTES
Subjective   Rehana Cruz is a 42 y.o. female.     History of Present Illness   Rehana is here w/ c/o Lt ear feeling clogged and decreased hearing.  She states it is a little sensitive.  Denies fever.  She does use otc drops for was about 2x/ mo.    The following portions of the patient's history were reviewed and updated as appropriate: allergies, current medications, past medical history, past social history and problem list.    Review of Systems   All other systems reviewed and are negative.      Objective   Physical Exam   Constitutional: She appears well-developed and well-nourished.   HENT:   Head: Normocephalic and atraumatic.   Left TM is red and swollen. Right TM is red.   Eyes: EOM are normal. Pupils are equal, round, and reactive to light.   Neck: Normal range of motion. Neck supple.   Skin: Skin is warm and dry.   Nursing note and vitals reviewed.      Assessment/Plan   Rehana was seen today for earache.    Diagnoses and all orders for this visit:    Bilateral non-suppurative otitis media    Impacted cerumen of both ears  -     neomycin-polymyxin-hydrocortisone (CORTISPORIN) 3.5-75954-2 otic solution; Administer 3 drops into both ears 3 (Three) Times a Day for 7 days.

## 2018-06-14 DIAGNOSIS — N92.1 MENOMETRORRHAGIA: ICD-10-CM

## 2018-06-14 RX ORDER — NORETHINDRONE ACETATE AND ETHINYL ESTRADIOL 1; 20 MG/1; UG/1
TABLET ORAL
Qty: 21 TABLET | Refills: 5 | Status: SHIPPED | OUTPATIENT
Start: 2018-06-14 | End: 2018-08-29 | Stop reason: ALTCHOICE

## 2018-08-19 DIAGNOSIS — I10 ESSENTIAL HYPERTENSION: ICD-10-CM

## 2018-08-21 RX ORDER — INDAPAMIDE 2.5 MG/1
TABLET, FILM COATED ORAL
Qty: 90 TABLET | Refills: 1 | Status: SHIPPED | OUTPATIENT
Start: 2018-08-21 | End: 2018-08-29 | Stop reason: SDUPTHER

## 2018-08-29 ENCOUNTER — OFFICE VISIT (OUTPATIENT)
Dept: OBSTETRICS AND GYNECOLOGY | Facility: CLINIC | Age: 43
End: 2018-08-29

## 2018-08-29 VITALS
BODY MASS INDEX: 41.01 KG/M2 | DIASTOLIC BLOOD PRESSURE: 95 MMHG | SYSTOLIC BLOOD PRESSURE: 134 MMHG | WEIGHT: 210 LBS | HEART RATE: 94 BPM

## 2018-08-29 DIAGNOSIS — Z01.419 ENCOUNTER FOR GYNECOLOGICAL EXAMINATION: Primary | ICD-10-CM

## 2018-08-29 DIAGNOSIS — Z12.4 PAP SMEAR FOR CERVICAL CANCER SCREENING: ICD-10-CM

## 2018-08-29 DIAGNOSIS — N92.0 MENORRHAGIA WITH REGULAR CYCLE: ICD-10-CM

## 2018-08-29 PROCEDURE — 99396 PREV VISIT EST AGE 40-64: CPT | Performed by: OBSTETRICS & GYNECOLOGY

## 2018-08-29 PROCEDURE — 99406 BEHAV CHNG SMOKING 3-10 MIN: CPT | Performed by: OBSTETRICS & GYNECOLOGY

## 2018-08-29 RX ORDER — MEDROXYPROGESTERONE ACETATE 150 MG/ML
150 INJECTION, SUSPENSION INTRAMUSCULAR
Qty: 1 ML | Refills: 3 | Status: SHIPPED | OUTPATIENT
Start: 2018-08-29 | End: 2019-09-17 | Stop reason: SDUPTHER

## 2018-08-29 RX ORDER — NORETHINDRONE ACETATE AND ETHINYL ESTRADIOL AND FERROUS FUMARATE 1MG-20(24)
1 KIT ORAL DAILY
COMMUNITY
End: 2018-08-29

## 2018-08-29 NOTE — PROGRESS NOTES
Subjective   Rehana Cruz is a 42 y.o. female. Here for annual exam.   Last Pap- needs pap.  Last Mammo - 2 years ago     History of Present Illness   Patient is a 42-year-old that presents for annual gynecological exam.  She was last seen approximately 3 years ago.  She has been on a combined oral contraceptive pill for history of menorrhagia.  She does admit to smoking 1-2 cigarettes per month when she is stressed.  Patient also has a history of hypertension.  She is due for her Pap smear and mammogram this year.  She has a maternal family history of ovarian cancer and patient has been tested for the BRCA gene and was negative.  Patient has no complaints today.  She reports not being sexually active in a long time.  She denies any history of abnormal Pap smears.    The following portions of the patient's history were reviewed and updated as appropriate: allergies, current medications, past family history, past medical history, past social history, past surgical history and problem list.    Review of Systems   Genitourinary: Negative for menstrual problem.   All other systems reviewed and are negative.      Objective   Physical Exam  Physical Exam   Constitutional: She appears well-developed and well-nourished.   Cardiovascular: Normal rate and regular rhythm.    Pulmonary/Chest: Effort normal and breath sounds normal. Right breast exhibits no inverted nipple, no mass, no nipple discharge, no skin change and no tenderness. Left breast exhibits no inverted nipple, no mass, no nipple discharge, no skin change and no tenderness.   Abdominal: Soft. She exhibits no distension. There is no tenderness.   Genitourinary: Vagina normal and uterus normal. There is no rash, tenderness, lesion or injury on the right labia. There is no rash, tenderness, lesion or injury on the left labia. Cervix exhibits no motion tenderness, no discharge and no friability. Right adnexum displays no mass, no tenderness and no fullness. Left  adnexum displays no mass, no tenderness and no fullness.   Neurological: She is alert.   Psychiatric: She has a normal mood and affect.   Vitals reviewed.      Assessment/Plan   Rehana was seen today for gynecologic exam.    Diagnoses and all orders for this visit:    Encounter for gynecological examination    Menorrhagia with regular cycle  -     medroxyPROGESTERone (DEPO-PROVERA) 150 MG/ML injection; Inject 1 mL into the appropriate muscle as directed by prescriber Every 3 (Three) Months.    Pap smear for cervical cancer screening  -     IGP, Aptima HPV, Rfx 16 / 18,45    Patient was counseled on risks of combined oral contraceptive pill in women over 35 that are smokers.  Also discussed risks associated with a combined oral contraceptive pill and hypertension.  Her blood pressure is overall well controlled today.  She states that she is trying to quit smoking.  We discussed other options that are more safe in smokers and with hypertension and she would like to try Depo-Provera.  Risks and side effects were discussed with her.  She may start Depo-Provera at the end of next week or early next week.  She was also counseled on need for screening mammograms every year and mammogram was ordered.  She may follow-up in 1 year for next annual exam or sooner if needed.    I advised Rehana of the risks of continuing to use tobacco, and I provided her with tobacco cessation educational materials in the After Visit Summary.     During this visit, I spent 3-10 minutes counseling the patient regarding tobacco cessation.

## 2018-09-03 LAB
CYTOLOGIST CVX/VAG CYTO: NORMAL
CYTOLOGY CVX/VAG DOC THIN PREP: NORMAL
DX ICD CODE: NORMAL
HIV 1 & 2 AB SER-IMP: NORMAL
HPV I/H RISK 4 DNA CVX QL PROBE+SIG AMP: NEGATIVE
OTHER STN SPEC: NORMAL
PATH REPORT.FINAL DX SPEC: NORMAL
STAT OF ADQ CVX/VAG CYTO-IMP: NORMAL

## 2018-09-04 ENCOUNTER — CLINICAL SUPPORT (OUTPATIENT)
Dept: OBSTETRICS AND GYNECOLOGY | Facility: CLINIC | Age: 43
End: 2018-09-04

## 2018-09-04 DIAGNOSIS — Z30.42 ENCOUNTER FOR MANAGEMENT AND INJECTION OF DEPO-PROVERA: Primary | ICD-10-CM

## 2018-09-04 PROCEDURE — 96372 THER/PROPH/DIAG INJ SC/IM: CPT | Performed by: OBSTETRICS & GYNECOLOGY

## 2018-09-04 RX ORDER — MEDROXYPROGESTERONE ACETATE 150 MG/ML
150 INJECTION, SUSPENSION INTRAMUSCULAR ONCE
Status: COMPLETED | OUTPATIENT
Start: 2018-09-04 | End: 2018-09-04

## 2018-09-04 RX ADMIN — MEDROXYPROGESTERONE ACETATE 150 MG: 150 INJECTION, SUSPENSION INTRAMUSCULAR at 10:04

## 2018-09-04 NOTE — PROGRESS NOTES
CC: Pt here for Depo Injection     Lot#:[id number]   Exp:07/2020   NDC:[address]-8    Pt tolerated injection

## 2018-09-20 DIAGNOSIS — I10 ESSENTIAL HYPERTENSION: ICD-10-CM

## 2018-09-20 RX ORDER — AMLODIPINE BESYLATE 5 MG/1
TABLET ORAL
Qty: 90 TABLET | Refills: 0 | Status: SHIPPED | OUTPATIENT
Start: 2018-09-20 | End: 2018-12-23 | Stop reason: SDUPTHER

## 2018-09-25 ENCOUNTER — OFFICE VISIT (OUTPATIENT)
Dept: FAMILY MEDICINE CLINIC | Facility: CLINIC | Age: 43
End: 2018-09-25

## 2018-09-25 VITALS
HEIGHT: 60 IN | OXYGEN SATURATION: 97 % | BODY MASS INDEX: 41.23 KG/M2 | DIASTOLIC BLOOD PRESSURE: 100 MMHG | TEMPERATURE: 99.2 F | HEART RATE: 96 BPM | WEIGHT: 210 LBS | SYSTOLIC BLOOD PRESSURE: 144 MMHG | RESPIRATION RATE: 16 BRPM

## 2018-09-25 DIAGNOSIS — Q05.7 SPINA BIFIDA OF LUMBOSACRAL REGION WITHOUT HYDROCEPHALUS (HCC): ICD-10-CM

## 2018-09-25 DIAGNOSIS — J06.9 ACUTE URI OF MULTIPLE SITES: Primary | ICD-10-CM

## 2018-09-25 DIAGNOSIS — E83.110 HEREDITARY HEMOCHROMATOSIS (HCC): ICD-10-CM

## 2018-09-25 PROCEDURE — 99213 OFFICE O/P EST LOW 20 MIN: CPT | Performed by: FAMILY MEDICINE

## 2018-09-25 RX ORDER — AZITHROMYCIN 250 MG/1
TABLET, FILM COATED ORAL
Qty: 6 TABLET | Refills: 0 | Status: SHIPPED | OUTPATIENT
Start: 2018-09-25 | End: 2018-10-26

## 2018-09-25 NOTE — PROGRESS NOTES
Subjective   Rehana Cruz is a 42 y.o. female.     History of Present Illness   Rehana is here w/ cough, wheezing and nasal congestion. Temp has been 99.8.   She has been ill about 2 weeks.  She has been taking Mucinex and Dayquil.    The following portions of the patient's history were reviewed and updated as appropriate: allergies, current medications, past medical history, past social history and problem list.    Review of Systems   HENT: Positive for congestion.    Respiratory: Positive for cough and wheezing.    All other systems reviewed and are negative.      Objective   Physical Exam   Constitutional: She appears well-developed.   In a wheel chair   HENT:   Head: Normocephalic and atraumatic.   Eyes: Pupils are equal, round, and reactive to light. EOM are normal.   Cardiovascular: Normal rate and regular rhythm.    Pulmonary/Chest: Effort normal. No respiratory distress. She has wheezes. She has no rales.   Abdominal: Soft. She exhibits no distension.   Neurological: She is alert.   Skin: Skin is warm and dry. No rash noted.   Vitals reviewed.      Assessment/Plan   Rehana was seen today for uri.    Diagnoses and all orders for this visit:    Acute URI of multiple sites  -     azithromycin (ZITHROMAX Z-ROGERIO) 250 MG tablet; Take 2 tablets the first day, then 1 tablet daily for 4 days.    Hereditary hemochromatosis (CMS/HCC)  Followed by hematology  Spina bifida of lumbosacral region without hydrocephalus (CMS/HCC)  Stable and followed by neruosurgery,    Patient Instructions   Stop bactrim for a few days and then take the Zpak - take Mucinex twice a day and drink lots of water.

## 2018-09-25 NOTE — PATIENT INSTRUCTIONS
Stop bactrim for a few days and then take the Zpak - take Mucinex twice a day and drink lots of water.

## 2018-10-26 ENCOUNTER — OFFICE VISIT (OUTPATIENT)
Dept: FAMILY MEDICINE CLINIC | Facility: CLINIC | Age: 43
End: 2018-10-26

## 2018-10-26 VITALS
DIASTOLIC BLOOD PRESSURE: 74 MMHG | HEART RATE: 90 BPM | RESPIRATION RATE: 16 BRPM | BODY MASS INDEX: 41.23 KG/M2 | WEIGHT: 210 LBS | SYSTOLIC BLOOD PRESSURE: 126 MMHG | HEIGHT: 60 IN | OXYGEN SATURATION: 98 %

## 2018-10-26 DIAGNOSIS — Z23 NEED FOR VACCINATION: Primary | ICD-10-CM

## 2018-10-26 DIAGNOSIS — L21.9 SEBORRHEIC DERMATITIS: ICD-10-CM

## 2018-10-26 DIAGNOSIS — E66.01 MORBIDLY OBESE (HCC): ICD-10-CM

## 2018-10-26 DIAGNOSIS — I10 ESSENTIAL HYPERTENSION: ICD-10-CM

## 2018-10-26 PROCEDURE — 99213 OFFICE O/P EST LOW 20 MIN: CPT | Performed by: FAMILY MEDICINE

## 2018-10-26 PROCEDURE — G0008 ADMIN INFLUENZA VIRUS VAC: HCPCS | Performed by: FAMILY MEDICINE

## 2018-10-26 PROCEDURE — 90674 CCIIV4 VAC NO PRSV 0.5 ML IM: CPT | Performed by: FAMILY MEDICINE

## 2018-10-26 RX ORDER — TRIAMCINOLONE ACETONIDE 1 MG/G
CREAM TOPICAL 2 TIMES DAILY
Qty: 60 G | Refills: 2 | Status: SHIPPED | OUTPATIENT
Start: 2018-10-26 | End: 2018-11-05

## 2018-10-26 NOTE — PROGRESS NOTES
Subjective   Rehana Cruz is a 42 y.o. female. Pt is here for her HTN and seborrhoic dermatitis f/u.       History of Present Illness   Rehana has a history of chronic hypertension and has been well controlled on current medications.She is tolerating medications without side effect. She reports no vision changes, headaches or lightheadedness. She is requesting refills of medications.    Pt is doing ok, but states the cream she used to use for her rash is way too expensive now, because she changed her insurance, and would like a alternative therapy.       The following portions of the patient's history were reviewed and updated as appropriate: allergies, current medications, past family history, past medical history, past social history and problem list.    Review of Systems   All other systems reviewed and are negative.      Objective   Physical Exam   Constitutional: She appears well-developed.   In motorized chair.   HENT:   Head: Normocephalic and atraumatic.   Eyes: Pupils are equal, round, and reactive to light. EOM are normal.   Cardiovascular: Normal rate and regular rhythm.    Pulmonary/Chest: Effort normal.   Abdominal: Soft. She exhibits no distension.   Neurological: She is alert.   Skin: Rash noted.   Pink scaly rash on forehead and cheeks   Vitals reviewed.      Assessment/Plan   Rehana was seen today for hypertension.    Diagnoses and all orders for this visit:    Seborrheic dermatitis  -     triamcinolone (KENALOG) 0.1 % cream; Apply  topically to the appropriate area as directed 2 (Two) Times a Day for 10 days.    Morbidly obese (CMS/HCC)  Advised diet changes.    Essential hypertension  Well controlled on current medication, will refill medication today and as needed. She will RTO for repeat B/P check in 6 months.

## 2018-11-29 ENCOUNTER — CLINICAL SUPPORT (OUTPATIENT)
Dept: OBSTETRICS AND GYNECOLOGY | Facility: CLINIC | Age: 43
End: 2018-11-29

## 2018-11-29 VITALS
DIASTOLIC BLOOD PRESSURE: 78 MMHG | SYSTOLIC BLOOD PRESSURE: 130 MMHG | WEIGHT: 210 LBS | HEIGHT: 60 IN | BODY MASS INDEX: 41.23 KG/M2

## 2018-11-29 DIAGNOSIS — Z30.42 ENCOUNTER FOR MANAGEMENT AND INJECTION OF DEPO-PROVERA: Primary | ICD-10-CM

## 2018-11-29 PROCEDURE — 96372 THER/PROPH/DIAG INJ SC/IM: CPT | Performed by: OBSTETRICS & GYNECOLOGY

## 2018-11-29 RX ORDER — FLUTICASONE PROPIONATE 50 MCG
1 SPRAY, SUSPENSION (ML) NASAL DAILY
COMMUNITY

## 2018-11-29 RX ORDER — INDAPAMIDE 2.5 MG/1
2.5 TABLET, FILM COATED ORAL DAILY
COMMUNITY
End: 2019-01-29 | Stop reason: SDUPTHER

## 2018-11-29 RX ORDER — SULFAMETHOXAZOLE AND TRIMETHOPRIM 800; 160 MG/1; MG/1
1 TABLET ORAL
COMMUNITY
End: 2019-01-29 | Stop reason: DRUGHIGH

## 2018-11-29 RX ORDER — MEDROXYPROGESTERONE ACETATE 150 MG/ML
150 INJECTION, SUSPENSION INTRAMUSCULAR ONCE
Status: COMPLETED | OUTPATIENT
Start: 2018-11-29 | End: 2018-11-29

## 2018-11-29 RX ORDER — VENLAFAXINE HYDROCHLORIDE 75 MG/1
75 CAPSULE, EXTENDED RELEASE ORAL DAILY
COMMUNITY
Start: 2014-05-27 | End: 2019-05-20 | Stop reason: SDUPTHER

## 2018-11-29 RX ORDER — AMLODIPINE BESYLATE 5 MG/1
5 TABLET ORAL DAILY
COMMUNITY
End: 2020-01-22

## 2018-11-29 RX ORDER — TRIAMCINOLONE ACETONIDE 1 MG/G
CREAM TOPICAL
Refills: 2 | COMMUNITY
Start: 2018-11-21 | End: 2023-03-21

## 2018-11-29 RX ADMIN — MEDROXYPROGESTERONE ACETATE 150 MG: 150 INJECTION, SUSPENSION INTRAMUSCULAR at 16:07

## 2018-11-29 NOTE — PROGRESS NOTES
Pt here for Depo Provera, pt supplied, given in l eft arm, no reaction. Lot # [id number] Exp. Date 05/2021, ndc [address]-4, rto 12 weeks.

## 2018-12-15 DIAGNOSIS — F32.89 OTHER DEPRESSION: ICD-10-CM

## 2018-12-17 RX ORDER — VENLAFAXINE 75 MG/1
TABLET ORAL
Qty: 120 TABLET | Refills: 1 | Status: SHIPPED | OUTPATIENT
Start: 2018-12-17 | End: 2019-01-29 | Stop reason: SDUPTHER

## 2018-12-23 DIAGNOSIS — I10 ESSENTIAL HYPERTENSION: ICD-10-CM

## 2018-12-24 RX ORDER — AMLODIPINE BESYLATE 5 MG/1
TABLET ORAL
Qty: 90 TABLET | Refills: 0 | Status: SHIPPED | OUTPATIENT
Start: 2018-12-24 | End: 2019-01-29 | Stop reason: SDUPTHER

## 2019-01-23 DIAGNOSIS — Z00.00 ROUTINE GENERAL MEDICAL EXAMINATION AT A HEALTH CARE FACILITY: Primary | ICD-10-CM

## 2019-01-23 DIAGNOSIS — Z13.220 SCREENING FOR LIPOID DISORDERS: ICD-10-CM

## 2019-01-28 LAB
ALBUMIN SERPL-MCNC: 4 G/DL (ref 3.5–5.2)
ALBUMIN/GLOB SERPL: 1.5 G/DL
ALP SERPL-CCNC: 83 U/L (ref 39–117)
ALT SERPL-CCNC: 19 U/L (ref 1–33)
AST SERPL-CCNC: 16 U/L (ref 1–32)
BILIRUB SERPL-MCNC: 0.3 MG/DL (ref 0.1–1.2)
BUN SERPL-MCNC: 14 MG/DL (ref 6–20)
BUN/CREAT SERPL: 32.6 (ref 7–25)
CALCIUM SERPL-MCNC: 9.3 MG/DL (ref 8.6–10.5)
CHLORIDE SERPL-SCNC: 97 MMOL/L (ref 98–107)
CHOLEST SERPL-MCNC: 232 MG/DL (ref 0–200)
CO2 SERPL-SCNC: 28.1 MMOL/L (ref 22–29)
CREAT SERPL-MCNC: 0.43 MG/DL (ref 0.57–1)
ERYTHROCYTE [DISTWIDTH] IN BLOOD BY AUTOMATED COUNT: 12.8 % (ref 11.7–13)
FERRITIN SERPL-MCNC: 67.85 NG/ML (ref 13–150)
GLOBULIN SER CALC-MCNC: 2.7 GM/DL
GLUCOSE SERPL-MCNC: 97 MG/DL (ref 65–99)
HCT VFR BLD AUTO: 44.2 % (ref 35.6–45.5)
HDLC SERPL-MCNC: 45 MG/DL (ref 40–60)
HGB BLD-MCNC: 15.2 G/DL (ref 11.9–15.5)
LDLC SERPL CALC-MCNC: 162 MG/DL (ref 0–100)
LDLC/HDLC SERPL: 3.6 {RATIO}
MCH RBC QN AUTO: 32.5 PG (ref 26.9–32)
MCHC RBC AUTO-ENTMCNC: 34.4 G/DL (ref 32.4–36.3)
MCV RBC AUTO: 94.6 FL (ref 80.5–98.2)
PLATELET # BLD AUTO: 320 10*3/MM3 (ref 140–500)
POTASSIUM SERPL-SCNC: 3.1 MMOL/L (ref 3.5–5.2)
PROT SERPL-MCNC: 6.7 G/DL (ref 6–8.5)
RBC # BLD AUTO: 4.67 10*6/MM3 (ref 3.9–5.2)
SODIUM SERPL-SCNC: 140 MMOL/L (ref 136–145)
TRIGL SERPL-MCNC: 126 MG/DL (ref 0–150)
VLDLC SERPL CALC-MCNC: 25.2 MG/DL (ref 5–40)
WBC # BLD AUTO: 7.3 10*3/MM3 (ref 4.5–10.7)

## 2019-01-29 ENCOUNTER — OFFICE VISIT (OUTPATIENT)
Dept: FAMILY MEDICINE CLINIC | Facility: CLINIC | Age: 44
End: 2019-01-29

## 2019-01-29 VITALS
DIASTOLIC BLOOD PRESSURE: 90 MMHG | RESPIRATION RATE: 16 BRPM | HEART RATE: 107 BPM | BODY MASS INDEX: 40.25 KG/M2 | SYSTOLIC BLOOD PRESSURE: 122 MMHG | HEIGHT: 60 IN | OXYGEN SATURATION: 99 % | WEIGHT: 205 LBS

## 2019-01-29 DIAGNOSIS — E83.110 HEREDITARY HEMOCHROMATOSIS (HCC): ICD-10-CM

## 2019-01-29 DIAGNOSIS — I10 ESSENTIAL HYPERTENSION: ICD-10-CM

## 2019-01-29 DIAGNOSIS — S99.921A INJURY OF TOE ON RIGHT FOOT, INITIAL ENCOUNTER: ICD-10-CM

## 2019-01-29 DIAGNOSIS — Z23 NEED FOR HEPATITIS VACCINATION: ICD-10-CM

## 2019-01-29 DIAGNOSIS — E66.01 MORBIDLY OBESE (HCC): ICD-10-CM

## 2019-01-29 DIAGNOSIS — F17.200 CURRENT SMOKER: ICD-10-CM

## 2019-01-29 DIAGNOSIS — E87.6 HYPOKALEMIA: ICD-10-CM

## 2019-01-29 DIAGNOSIS — S99.922A INJURY OF TOE ON LEFT FOOT, INITIAL ENCOUNTER: ICD-10-CM

## 2019-01-29 DIAGNOSIS — F32.89 OTHER DEPRESSION: ICD-10-CM

## 2019-01-29 DIAGNOSIS — Q05.7 SPINA BIFIDA OF LUMBOSACRAL REGION WITHOUT HYDROCEPHALUS (HCC): ICD-10-CM

## 2019-01-29 DIAGNOSIS — Z00.00 ROUTINE GENERAL MEDICAL EXAMINATION AT A HEALTH CARE FACILITY: Primary | ICD-10-CM

## 2019-01-29 PROCEDURE — G0009 ADMIN PNEUMOCOCCAL VACCINE: HCPCS | Performed by: FAMILY MEDICINE

## 2019-01-29 PROCEDURE — 90732 PPSV23 VACC 2 YRS+ SUBQ/IM: CPT | Performed by: FAMILY MEDICINE

## 2019-01-29 PROCEDURE — 96160 PT-FOCUSED HLTH RISK ASSMT: CPT | Performed by: FAMILY MEDICINE

## 2019-01-29 PROCEDURE — G0439 PPPS, SUBSEQ VISIT: HCPCS | Performed by: FAMILY MEDICINE

## 2019-01-29 NOTE — PROGRESS NOTES
Medicare Subsequent Wellness Visit  Subjective   History of Present Illness    Rehana Cruz is a 43 y.o. female who presents for an Medicare Wellness Visit. In addition, we addressed the following health issues:    Rehana has a history of chronic hypertension and has been well controlled on current medications.She is tolerating medications without side effect. She reports no vision changes, headaches or lightheadedness. She is requesting refills of medications.  She has a hx of spina bifida and uses a chair to ambulate. She is stable at this time and managing well.  Because of the above condition, she struggles with chronic depression and is doing better on Effexor.  She also has a hx of a neurogenic bladder since birth and she finds that Ditropan helps some with this and she would like a refill.  A new problem today is purple, non-blanchable deep tissue injuries on left foot 5th lateral toe as well as right foot 5th lateral toe. No open or draining areas. Pt states she has had BLE edema and redness as a chronic issue. Pt noticed this purple tissue discoloration yesterday. States she has had minor skin irritation on her left pinky toe in the past. States her feet swell during the day and her toes may be pressing against the side of her shoes. No sensation in BLE 2/2 spina bifida.     She was noted to have mild hypokalemia on recent labs.     The following portions of the patient's history were reviewed and updated as appropriate: allergies, current medications, past family history, past medical history, past social history and problem list.      Review of Systems   HENT: Positive for postnasal drip.         Seasonal allergies   Eyes: Visual disturbance: stigmatisim    Cardiovascular: Positive for leg swelling (in the afternoons, resolves with leg elevation).   Gastrointestinal: Negative.         Occasional acid reflux- chronic problem. Takes zantac prn.    Endocrine: Negative.    Genitourinary: Positive for  frequency (self caths 3-4xday).   Musculoskeletal: Negative.    Skin: Positive for color change (dependent rubor) and wound (deep tissue injury on left and right pinky toes (lateral aspect)). Negative for rash.   Allergic/Immunologic: Positive for environmental allergies. Negative for food allergies.   Hematological: Negative.        Objective   Physical Exam   Constitutional: She appears well-developed.   In motorized chair.   HENT:   Head: Normocephalic and atraumatic.   Eyes: Pupils are equal, round, and reactive to light. EOM are normal.   Cardiovascular: Normal rate and regular rhythm.    Pulmonary/Chest: Effort normal.   Abdominal: Soft. She exhibits no distension.   Neurological: She is alert.   EXT:deep tissue injuries to bilateral pinky toes (lateral aspect)  Skin:no rashes    Vitals reviewed.               PMH, PSH, SocHx, FamHx, Allergies, and Medications: Reviewed and updated in the history section of chart.  Family History   Problem Relation Age of Onset   • Ovarian cancer Mother         50's   • Multiple sclerosis Father    • Leukemia Paternal Uncle    • Alzheimer's disease Maternal Grandmother    • Leukemia Maternal Grandfather    • Hemochromatosis Brother        Social History     Social History Narrative    Lives alone in [place] with 2 cats.       Allergies   Allergen Reactions   • Latex Rash       Outpatient Medications Prior to Visit   Medication Sig Dispense Refill   • amLODIPine (NORVASC) 5 MG tablet Take 5 mg by mouth Daily.     • indapamide (LOZOL) 2.5 MG tablet TAKE 1 TABLET BY MOUTH EVERY MORNING 90 tablet 0   • medroxyPROGESTERone (DEPO-PROVERA) 150 MG/ML injection Inject 1 mL into the appropriate muscle as directed by prescriber Every 3 (Three) Months. 1 mL 3   • oxybutynin XL (DITROPAN XL) 15 MG 24 hr tablet Take 15 mg by mouth daily. extended release     • venlafaxine XR (EFFEXOR-XR) 75 MG 24 hr capsule Take 75 mg by mouth Daily. 2 qam  1 qpm     • sulfamethoxazole-trimethoprim  (BACTRIM DS,SEPTRA DS) 800-160 MG per tablet Take 1 tablet by mouth.     • venlafaxine (EFFEXOR) 75 MG tablet TAKE 2 TABLETS BY MOUTH TWICE DAILY 120 tablet 0   • fluticasone (FLONASE) 50 MCG/ACT nasal spray 1 spray into the nostril(s) as directed by provider Daily.     • sulfamethoxazole-trimethoprim (BACTRIM,SEPTRA) 400-80 MG tablet TK 1 T PO QHS  11   • triamcinolone (KENALOG) 0.1 % cream MORTEZA AA BID FOR 10 DAYS UTD  2   • amLODIPine (NORVASC) 5 MG tablet TAKE 1 TABLET BY MOUTH DAILY 90 tablet 0   • indapamide (LOZOL) 2.5 MG tablet Take 2.5 mg by mouth Daily.     • venlafaxine (EFFEXOR) 75 MG tablet TAKE 2 TABLETS BY MOUTH TWICE DAILY 120 tablet 1     No facility-administered medications prior to visit.         Patient Active Problem List   Diagnosis   • Spina bifida (CMS/HCC)   • Depression   • Neurogenic bladder   • Hemochromatosis   • Hypertension   • Hereditary hemochromatosis (CMS/HCC)   • Morbidly obese (CMS/HCC)   • Current smoker   • Hypokalemia         Patient Care Team:  Rogelio De La Rosa MD as PCP - General  Health Habits:  Current Diet: Well balanced diet  Dental Exam. States needs to schedule.  Eye Exam. UTD (6 mo ago)  Exercise: few times per month  Current exercise activities include:yoga, strolls    Recent Hospitalizations:  none    Age-appropriate Screening Schedule:  Refer to the list below for future screening recommendations based on patient's age. Orders for these recommended tests are listed in the plan section. The patient has been provided with a written plan.    Health Maintenance   Topic Date Due   • PNEUMOCOCCAL VACCINE (19-64 MEDIUM RISK) (1 of 1 - PPSV23) 12/15/1995   • MEDICARE ANNUAL WELLNESS  01/29/2020   • PAP SMEAR  08/29/2021   • TDAP/TD VACCINES (2 - Td) 03/29/2026   • INFLUENZA VACCINE  Completed       Depression Screen:   PHQ-2/PHQ-9 Depression Screening 1/29/2019   Little interest or pleasure in doing things 0   Feeling down, depressed, or hopeless 0   Total Score 0  "      Functional and Cognitive Screening:  Functional & Cognitive Status 1/29/2019   Do you have difficulty preparing food and eating? No   Do you have difficulty bathing yourself, getting dressed or grooming yourself? No   Do you have difficulty using the toilet? No   Do you have difficulty moving around from place to place? No   Do you have trouble with steps or getting out of a bed or a chair? No   In the past year have you fallen or experienced a near fall? Yes   Current Diet Frequent Junk Food   Dental Exam Up to date   Eye Exam Up to date   Exercise (times per week) 0 times per week   Current Exercise Activities Include None   Do you need help using the phone?  No   Are you deaf or do you have serious difficulty hearing?  No   Do you need help with transportation? No   Do you need help shopping? No   Do you need help preparing meals?  No   Do you need help with housework?  No   Do you need help with laundry? No   Do you need help taking your medications? No   Do you need help managing money? No   Do you ever drive or ride in a car without wearing a seat belt? No   Have you felt unusual stress, anger or loneliness in the last month? No   Who do you live with? Alone   If you need help, do you have trouble finding someone available to you? Yes   Have you been bothered in the last four weeks by sexual problems? No   Do you have difficulty concentrating, remembering or making decisions? No     Does the patient have evidence of cognitive impairment? none      Objective     Vitals:    01/29/19 1353   BP: 122/90   Pulse: 107   Resp: 16   SpO2: 99%   Weight: 93 kg (205 lb)   Height: 152.4 cm (60\")       Body mass index is 40.04 kg/m².        ASSESSMENT AND PLAN      Problem List Items Addressed This Visit        Cardiovascular and Mediastinum    Hypertension  This is a chronic problem that has been well managed on current medications. Will refill as needed.        Digestive    Hereditary hemochromatosis " (CMS/[facility])  Followed by hematology      Morbidly obese (CMS/[facility])  Diet and exercise advised       Nervous and Auditory    Spina bifida (CMS/[facility])  Stable       Other    Depression  She is well managed on current meds and reports no signs or symptoms of self harm, suicidal ideation. This medication helps with daily life and relationships. Will refill as needed and advised 6 month follow up.    Current smoker  Prevnar given today       Other Visit Diagnoses       Routine general medical examination at a health care facility    -  Primary  Labs reviewed and on chart. Potassium is a little low from diuretic. I have  Advised her to take and OTC potassium supplement and f/u with me in one week.      Injury of toe on left foot, initial encounter        Injury of toe on right foot, initial encounter      She was advised to try using lotions, changing her shoes and f/u with me  In one week.          Orders:  Orders Placed This Encounter   Procedures   • Pneumococcal Polysaccharide Vaccine 23-Valent (PPSV23) Greater Than or Equal To 3yo Subcutaneous / IM       Follow Up:  Return in about 1 week (around 2/5/2019) for Recheck.     ADVANCED DIRECTIVES:advised    An After Visit Summary and PPPS with all of these plans were given to the patient.

## 2019-01-30 NOTE — PATIENT INSTRUCTIONS
Labs reviewed - advised PTC potassium supplement, better care of her feet and follow up with me in one week.

## 2019-02-14 ENCOUNTER — OFFICE VISIT (OUTPATIENT)
Dept: FAMILY MEDICINE CLINIC | Facility: CLINIC | Age: 44
End: 2019-02-14

## 2019-02-14 VITALS
HEART RATE: 96 BPM | WEIGHT: 205 LBS | OXYGEN SATURATION: 98 % | DIASTOLIC BLOOD PRESSURE: 84 MMHG | SYSTOLIC BLOOD PRESSURE: 124 MMHG | RESPIRATION RATE: 16 BRPM | BODY MASS INDEX: 40.25 KG/M2 | HEIGHT: 60 IN

## 2019-02-14 DIAGNOSIS — E87.6 HYPOKALEMIA: ICD-10-CM

## 2019-02-14 DIAGNOSIS — L97.521 TOE ULCER, LEFT, LIMITED TO BREAKDOWN OF SKIN (HCC): Primary | ICD-10-CM

## 2019-02-14 PROCEDURE — 99213 OFFICE O/P EST LOW 20 MIN: CPT | Performed by: FAMILY MEDICINE

## 2019-02-14 NOTE — PROGRESS NOTES
Subjective   Rehana Cruz is a 43 y.o. female.     History of Present Illness   Rehana is here for follow up of her foot ulcer and to get her K+ rechecked.  She has a deroofed lesion on her left 5th toe. She has no feeling in her feet and has occasional blisters that get infected. She has no signs of infection today.  She takes a mild diuretic to help manage swelling in her feet and blood pressure.     The following portions of the patient's history were reviewed and updated as appropriate: allergies, current medications, past medical history, past social history and problem list.    Review of Systems   Skin: Positive for wound.       Objective   Physical Exam   Constitutional: She appears well-developed and well-nourished.   In motorized chair.   HENT:   Head: Normocephalic and atraumatic.   Eyes: EOM are normal. Pupils are equal, round, and reactive to light.   Cardiovascular: Normal rate and regular rhythm.   Pulmonary/Chest: Effort normal.   Musculoskeletal:   Bilateral feet are deep red, warm. Poor cap refill in toes, left 5th toe has scaling and ulceration .    Neurological: She is alert.   Skin:   Pink scaly rash on forehead and cheeks   Vitals reviewed.      Assessment/Plan   Rehana was seen today for foot ulcer.    Diagnoses and all orders for this visit:    Toe ulcer, left, limited to breakdown of skin (CMS/HCC)  Toe was treated with antibiotic ointment and bandaged. She was advised on aftercare and will f/u with me in one month.    Hypokalemia  -     Comprehensive Metabolic Panel

## 2019-02-14 NOTE — PATIENT INSTRUCTIONS
Try Bandaid, Curad Blister Bandages. Use antibiotic ointment daily.   Please come back in one month for re-check.  I will call you with lab results.

## 2019-02-15 LAB
ALBUMIN SERPL-MCNC: 4.3 G/DL (ref 3.5–5.5)
ALBUMIN/GLOB SERPL: 1.7 {RATIO} (ref 1.2–2.2)
ALP SERPL-CCNC: 95 IU/L (ref 39–117)
ALT SERPL-CCNC: 16 IU/L (ref 0–32)
AST SERPL-CCNC: 17 IU/L (ref 0–40)
BILIRUB SERPL-MCNC: <0.2 MG/DL (ref 0–1.2)
BUN SERPL-MCNC: 14 MG/DL (ref 6–24)
BUN/CREAT SERPL: 28 (ref 9–23)
CALCIUM SERPL-MCNC: 9.3 MG/DL (ref 8.7–10.2)
CHLORIDE SERPL-SCNC: 96 MMOL/L (ref 96–106)
CO2 SERPL-SCNC: 24 MMOL/L (ref 20–29)
CREAT SERPL-MCNC: 0.5 MG/DL (ref 0.57–1)
GLOBULIN SER CALC-MCNC: 2.6 G/DL (ref 1.5–4.5)
GLUCOSE SERPL-MCNC: 75 MG/DL (ref 65–99)
POTASSIUM SERPL-SCNC: 3.3 MMOL/L (ref 3.5–5.2)
PROT SERPL-MCNC: 6.9 G/DL (ref 6–8.5)
SODIUM SERPL-SCNC: 140 MMOL/L (ref 134–144)

## 2019-02-22 DIAGNOSIS — F32.89 OTHER DEPRESSION: ICD-10-CM

## 2019-02-22 DIAGNOSIS — I10 ESSENTIAL HYPERTENSION: ICD-10-CM

## 2019-02-22 RX ORDER — VENLAFAXINE 75 MG/1
TABLET ORAL
Qty: 120 TABLET | Refills: 0 | Status: SHIPPED | OUTPATIENT
Start: 2019-02-22 | End: 2019-08-28 | Stop reason: SDUPTHER

## 2019-02-22 RX ORDER — INDAPAMIDE 2.5 MG/1
TABLET, FILM COATED ORAL
Qty: 90 TABLET | Refills: 0 | Status: SHIPPED | OUTPATIENT
Start: 2019-02-22 | End: 2019-04-18 | Stop reason: SDUPTHER

## 2019-03-11 ENCOUNTER — CLINICAL SUPPORT (OUTPATIENT)
Dept: OBSTETRICS AND GYNECOLOGY | Facility: CLINIC | Age: 44
End: 2019-03-11

## 2019-03-11 VITALS
HEIGHT: 60 IN | BODY MASS INDEX: 40.25 KG/M2 | SYSTOLIC BLOOD PRESSURE: 138 MMHG | WEIGHT: 205 LBS | DIASTOLIC BLOOD PRESSURE: 98 MMHG

## 2019-03-11 DIAGNOSIS — Z30.013 ENCOUNTER FOR PRESCRIPTION FOR DEPO-PROVERA: Primary | ICD-10-CM

## 2019-03-11 LAB
B-HCG UR QL: NEGATIVE
INTERNAL NEGATIVE CONTROL: NEGATIVE
INTERNAL POSITIVE CONTROL: POSITIVE
Lab: NORMAL

## 2019-03-11 PROCEDURE — 96372 THER/PROPH/DIAG INJ SC/IM: CPT | Performed by: OBSTETRICS & GYNECOLOGY

## 2019-03-11 PROCEDURE — 81025 URINE PREGNANCY TEST: CPT | Performed by: OBSTETRICS & GYNECOLOGY

## 2019-03-11 RX ORDER — MEDROXYPROGESTERONE ACETATE 150 MG/ML
150 INJECTION, SUSPENSION INTRAMUSCULAR ONCE
Status: COMPLETED | OUTPATIENT
Start: 2019-03-11 | End: 2019-03-11

## 2019-03-11 RX ADMIN — MEDROXYPROGESTERONE ACETATE 150 MG: 150 INJECTION, SUSPENSION INTRAMUSCULAR at 12:32

## 2019-03-11 NOTE — PROGRESS NOTES
Patient here for Depo Provera Injection of Left arm. Patient supplied, no reaction, Lot # [id number], Exp. Date 05/2021, ndc [address]-9. Rto 12 weeks.

## 2019-04-18 DIAGNOSIS — F32.89 OTHER DEPRESSION: ICD-10-CM

## 2019-04-18 DIAGNOSIS — I10 ESSENTIAL HYPERTENSION: ICD-10-CM

## 2019-04-18 RX ORDER — AMLODIPINE BESYLATE 5 MG/1
TABLET ORAL
Qty: 90 TABLET | Refills: 0 | Status: SHIPPED | OUTPATIENT
Start: 2019-04-18 | End: 2019-06-12

## 2019-04-18 RX ORDER — INDAPAMIDE 2.5 MG/1
TABLET, FILM COATED ORAL
Qty: 90 TABLET | Refills: 0 | Status: SHIPPED | OUTPATIENT
Start: 2019-04-18 | End: 2019-07-21 | Stop reason: SDUPTHER

## 2019-04-18 RX ORDER — VENLAFAXINE 75 MG/1
TABLET ORAL
Qty: 120 TABLET | Refills: 0 | Status: SHIPPED | OUTPATIENT
Start: 2019-04-18 | End: 2019-08-28 | Stop reason: SDUPTHER

## 2019-05-18 DIAGNOSIS — F32.89 OTHER DEPRESSION: ICD-10-CM

## 2019-05-20 RX ORDER — VENLAFAXINE 75 MG/1
TABLET ORAL
Qty: 120 TABLET | Refills: 1 | Status: SHIPPED | OUTPATIENT
Start: 2019-05-20 | End: 2019-06-12 | Stop reason: SDUPTHER

## 2019-06-12 ENCOUNTER — OFFICE VISIT (OUTPATIENT)
Dept: FAMILY MEDICINE CLINIC | Facility: CLINIC | Age: 44
End: 2019-06-12

## 2019-06-12 VITALS
SYSTOLIC BLOOD PRESSURE: 138 MMHG | HEART RATE: 97 BPM | RESPIRATION RATE: 16 BRPM | TEMPERATURE: 98.2 F | DIASTOLIC BLOOD PRESSURE: 78 MMHG | OXYGEN SATURATION: 98 %

## 2019-06-12 DIAGNOSIS — J06.9 VIRAL UPPER RESPIRATORY TRACT INFECTION: ICD-10-CM

## 2019-06-12 DIAGNOSIS — H61.23 BILATERAL IMPACTED CERUMEN: Primary | ICD-10-CM

## 2019-06-12 PROCEDURE — 99213 OFFICE O/P EST LOW 20 MIN: CPT | Performed by: NURSE PRACTITIONER

## 2019-06-12 NOTE — PROGRESS NOTES
Assessment and Plan  Problem List Items Addressed This Visit     None      Visit Diagnoses     Bilateral impacted cerumen    -  Primary    Relevant Orders    Ear Cerumen Removal Lavage    Viral upper respiratory tract infection            F/U and Patient Instructions    No Follow-up on file.  Check the visit checklist  Check Health Maintenance if not already done  Subjective    Rehana Cruz is a 43 y.o. female being seen in our office today for Sinusitis (bilateral ear fullness/pain)     Patient History              Social History  She  reports that she has been smoking cigarettes.  She has a 10.00 pack-year smoking history. She has never used smokeless tobacco. She reports that she drinks alcohol. She reports that she does not use drugs.             History of the Present Illness  HPI  Significant Past History  The following portions of the patient's history were reviewed and updated as appropriate:PMHroutine: Social history , Allergies, Current Medications, Active Problem List and Health Maintenance          Review of Symptoms  Review of Systems   Constitutional: Positive for fatigue.   HENT: Positive for congestion, ear pain, sinus pressure, sinus pain and sore throat.    Neurological: Positive for headaches.     Objective  Vital Signs         BP Readings from Last 1 Encounters:   06/12/19 138/78     Wt Readings from Last 3 Encounters:   03/11/19 93 kg (205 lb)   02/14/19 93 kg (205 lb)   01/29/19 93 kg (205 lb)   There is no height or weight on file to calculate BMI.          Physical Exam   Physical Exam   Constitutional: She appears well-developed and well-nourished. No distress.   HENT:   Head: Normocephalic and atraumatic.   Cerumen impaction   Eyes: EOM are normal.   Neck: Neck supple.   Cardiovascular: Normal rate and regular rhythm.   Pulmonary/Chest: Effort normal and breath sounds normal.   Neurological: She is alert.   Skin: Skin is warm.   Nursing note and vitals reviewed.    Data  Reviewed    Recent Results (from the past 4704 hour(s))   Lipid Panel With LDL / HDL Ratio    Collection Time: 01/28/19  9:06 AM   Result Value Ref Range    Total Cholesterol 232 (H) 0 - 200 mg/dL    Triglycerides 126 0 - 150 mg/dL    HDL Cholesterol 45 40 - 60 mg/dL    VLDL Cholesterol 25.2 5 - 40 mg/dL    LDL Cholesterol  162 (H) 0 - 100 mg/dL    LDL/HDL Ratio 3.60    Comprehensive Metabolic Panel    Collection Time: 01/28/19  9:06 AM   Result Value Ref Range    Glucose 97 65 - 99 mg/dL    BUN 14 6 - 20 mg/dL    Creatinine 0.43 (L) 0.57 - 1.00 mg/dL    eGFR Non African Am >150 >60 mL/min/1.73    eGFR African Am >150 >60 mL/min/1.73    BUN/Creatinine Ratio 32.6 (H) 7.0 - 25.0    Sodium 140 136 - 145 mmol/L    Potassium 3.1 (L) 3.5 - 5.2 mmol/L    Chloride 97 (L) 98 - 107 mmol/L    Total CO2 28.1 22.0 - 29.0 mmol/L    Calcium 9.3 8.6 - 10.5 mg/dL    Total Protein 6.7 6.0 - 8.5 g/dL    Albumin 4.00 3.50 - 5.20 g/dL    Globulin 2.7 gm/dL    A/G Ratio 1.5 g/dL    Total Bilirubin 0.3 0.1 - 1.2 mg/dL    Alkaline Phosphatase 83 39 - 117 U/L    AST (SGOT) 16 1 - 32 U/L    ALT (SGPT) 19 1 - 33 U/L   CBC (No Diff)    Collection Time: 01/28/19  9:06 AM   Result Value Ref Range    WBC 7.30 4.50 - 10.70 10*3/mm3    RBC 4.67 3.90 - 5.20 10*6/mm3    Hemoglobin 15.2 11.9 - 15.5 g/dL    Hematocrit 44.2 35.6 - 45.5 %    MCV 94.6 80.5 - 98.2 fL    MCH 32.5 (H) 26.9 - 32.0 pg    MCHC 34.4 32.4 - 36.3 g/dL    RDW 12.8 11.7 - 13.0 %    Platelets 320 140 - 500 10*3/mm3   Ferritin    Collection Time: 01/28/19  9:06 AM   Result Value Ref Range    Ferritin 67.85 13.00 - 150.00 ng/mL   Comprehensive Metabolic Panel    Collection Time: 02/14/19 12:00 AM   Result Value Ref Range    Glucose 75 65 - 99 mg/dL    BUN 14 6 - 24 mg/dL    Creatinine 0.50 (L) 0.57 - 1.00 mg/dL    eGFR Non African Am 120 >59 mL/min/1.73    eGFR African Am 138 >59 mL/min/1.73    BUN/Creatinine Ratio 28 (H) 9 - 23    Sodium 140 134 - 144 mmol/L    Potassium 3.3 (L) 3.5 -  5.2 mmol/L    Chloride 96 96 - 106 mmol/L    Total CO2 24 20 - 29 mmol/L    Calcium 9.3 8.7 - 10.2 mg/dL    Total Protein 6.9 6.0 - 8.5 g/dL    Albumin 4.3 3.5 - 5.5 g/dL    Globulin 2.6 1.5 - 4.5 g/dL    A/G Ratio 1.7 1.2 - 2.2    Total Bilirubin <0.2 0.0 - 1.2 mg/dL    Alkaline Phosphatase 95 39 - 117 IU/L    AST (SGOT) 17 0 - 40 IU/L    ALT (SGPT) 16 0 - 32 IU/L   POC Pregnancy, Urine    Collection Time: 03/11/19 12:31 PM   Result Value Ref Range    HCG, Urine, QL Negative Negative    Lot Number [id number]     Internal Positive Control Positive     Internal Negative Control Negative

## 2019-06-13 NOTE — PATIENT INSTRUCTIONS

## 2019-07-03 ENCOUNTER — CLINICAL SUPPORT (OUTPATIENT)
Dept: OBSTETRICS AND GYNECOLOGY | Facility: CLINIC | Age: 44
End: 2019-07-03

## 2019-07-03 DIAGNOSIS — Z30.42 ENCOUNTER FOR MANAGEMENT AND INJECTION OF DEPO-PROVERA: Primary | ICD-10-CM

## 2019-07-03 PROCEDURE — 96372 THER/PROPH/DIAG INJ SC/IM: CPT | Performed by: OBSTETRICS & GYNECOLOGY

## 2019-07-03 RX ORDER — MEDROXYPROGESTERONE ACETATE 150 MG/ML
150 INJECTION, SUSPENSION INTRAMUSCULAR ONCE
Status: COMPLETED | OUTPATIENT
Start: 2019-07-03 | End: 2019-07-03

## 2019-07-03 RX ADMIN — MEDROXYPROGESTERONE ACETATE 150 MG: 150 INJECTION, SUSPENSION INTRAMUSCULAR at 10:32

## 2019-07-03 NOTE — PROGRESS NOTES
CC: Pt here for Depo injection.  LT D      Lot#:[id number]  Exp:07/2021  NDC:[address]-0    Pt tolerated injection

## 2019-07-21 DIAGNOSIS — F32.89 OTHER DEPRESSION: ICD-10-CM

## 2019-07-21 DIAGNOSIS — I10 ESSENTIAL HYPERTENSION: ICD-10-CM

## 2019-07-22 RX ORDER — AMLODIPINE BESYLATE 5 MG/1
TABLET ORAL
Qty: 90 TABLET | Refills: 0 | Status: SHIPPED | OUTPATIENT
Start: 2019-07-22 | End: 2019-08-25 | Stop reason: SDUPTHER

## 2019-07-22 RX ORDER — VENLAFAXINE 75 MG/1
TABLET ORAL
Qty: 120 TABLET | Refills: 0 | Status: SHIPPED | OUTPATIENT
Start: 2019-07-22 | End: 2019-08-28 | Stop reason: SDUPTHER

## 2019-07-22 RX ORDER — INDAPAMIDE 2.5 MG/1
TABLET, FILM COATED ORAL
Qty: 90 TABLET | Refills: 0 | Status: SHIPPED | OUTPATIENT
Start: 2019-07-22 | End: 2019-12-04 | Stop reason: SDUPTHER

## 2019-08-25 DIAGNOSIS — I10 ESSENTIAL HYPERTENSION: ICD-10-CM

## 2019-08-26 RX ORDER — AMLODIPINE BESYLATE 5 MG/1
TABLET ORAL
Qty: 90 TABLET | Refills: 0 | Status: SHIPPED | OUTPATIENT
Start: 2019-08-26 | End: 2020-01-06 | Stop reason: SDUPTHER

## 2019-08-28 DIAGNOSIS — F32.89 OTHER DEPRESSION: ICD-10-CM

## 2019-08-28 RX ORDER — VENLAFAXINE 75 MG/1
TABLET ORAL
Qty: 120 TABLET | Refills: 0 | Status: SHIPPED | OUTPATIENT
Start: 2019-08-28 | End: 2019-11-04 | Stop reason: SDUPTHER

## 2019-09-06 ENCOUNTER — OFFICE VISIT (OUTPATIENT)
Dept: FAMILY MEDICINE CLINIC | Facility: CLINIC | Age: 44
End: 2019-09-06

## 2019-09-06 VITALS
WEIGHT: 205 LBS | OXYGEN SATURATION: 98 % | SYSTOLIC BLOOD PRESSURE: 130 MMHG | HEIGHT: 60 IN | RESPIRATION RATE: 16 BRPM | BODY MASS INDEX: 40.25 KG/M2 | HEART RATE: 82 BPM | DIASTOLIC BLOOD PRESSURE: 80 MMHG

## 2019-09-06 DIAGNOSIS — Q05.7 SPINA BIFIDA OF LUMBOSACRAL REGION WITHOUT HYDROCEPHALUS (HCC): ICD-10-CM

## 2019-09-06 DIAGNOSIS — I99.8 VASCULAR INSUFFICIENCY OF EXTREMITY: Primary | ICD-10-CM

## 2019-09-06 PROCEDURE — 99213 OFFICE O/P EST LOW 20 MIN: CPT | Performed by: FAMILY MEDICINE

## 2019-09-06 NOTE — PROGRESS NOTES
Subjective   Rehana Cruz is a 43 y.o. female.     History of Present Illness   Rehana is here w/ a rash on RLE. She is not sure if this is what   this is. She has been om Bactrim for 15 days for a UTI. She has spina bifida and is in a wheel chair all day. She cannot feel her legs or feet.  She has no other symptoms.    The following portions of the patient's history were reviewed and updated as appropriate: allergies, current medications, past family history, past medical history, past social history, past surgical history and problem list.    Review of Systems   Constitutional: Positive for fatigue.   Gastrointestinal: Positive for constipation.   Psychiatric/Behavioral: Positive for sleep disturbance.   All other systems reviewed and are negative.      Objective   Physical Exam   Constitutional:   In a motorized chair.   Musculoskeletal:   Both feet are edematous, right foot and toes are very cool to the touch, yoandy, purple and has very poor cap refill in toes. No edema in right leg, mild pink calf, cool to the touch.  Left leg and foot are mildly discolored.   Nursing note and vitals reviewed.        Assessment/Plan   Rehana was seen today for rash.    Diagnoses and all orders for this visit:    Vascular insufficiency of extremity  -     Ambulatory Referral to Vascular Surgery    Spina bifida of lumbosacral region without hydrocephalus (CMS/HCC)    I have explained to Rehana that Bactrim for 2 weeks would have managed any infection in her leg she might have had. I am more concerned about the change in her vascular status in her right leg and I referred her to a vascular specialit for evaluation.

## 2019-09-17 DIAGNOSIS — N92.0 MENORRHAGIA WITH REGULAR CYCLE: ICD-10-CM

## 2019-09-17 RX ORDER — MEDROXYPROGESTERONE ACETATE 150 MG/ML
INJECTION, SUSPENSION INTRAMUSCULAR
Qty: 1 ML | Refills: 0 | Status: SHIPPED | OUTPATIENT
Start: 2019-09-17 | End: 2019-12-04 | Stop reason: SDUPTHER

## 2019-09-18 ENCOUNTER — CLINICAL SUPPORT (OUTPATIENT)
Dept: OBSTETRICS AND GYNECOLOGY | Facility: CLINIC | Age: 44
End: 2019-09-18

## 2019-09-18 DIAGNOSIS — Z30.42 ENCOUNTER FOR MANAGEMENT AND INJECTION OF DEPO-PROVERA: Primary | ICD-10-CM

## 2019-09-18 PROCEDURE — 96372 THER/PROPH/DIAG INJ SC/IM: CPT | Performed by: OBSTETRICS & GYNECOLOGY

## 2019-09-18 RX ORDER — MEDROXYPROGESTERONE ACETATE 150 MG/ML
150 INJECTION, SUSPENSION INTRAMUSCULAR ONCE
Status: COMPLETED | OUTPATIENT
Start: 2019-09-18 | End: 2019-09-18

## 2019-09-18 RX ADMIN — MEDROXYPROGESTERONE ACETATE 150 MG: 150 INJECTION, SUSPENSION INTRAMUSCULAR at 17:44

## 2019-09-18 NOTE — PROGRESS NOTES
CC: Pt here for Depo injection     Lot#:[id number]  Exp:08/2021  NDC:[address]-0    Pt tolerated injection

## 2019-11-04 DIAGNOSIS — F32.89 OTHER DEPRESSION: ICD-10-CM

## 2019-11-04 RX ORDER — VENLAFAXINE 75 MG/1
TABLET ORAL
Qty: 120 TABLET | Refills: 0 | Status: SHIPPED | OUTPATIENT
Start: 2019-11-04 | End: 2019-12-16

## 2019-12-04 DIAGNOSIS — I10 ESSENTIAL HYPERTENSION: ICD-10-CM

## 2019-12-04 DIAGNOSIS — N92.0 MENORRHAGIA WITH REGULAR CYCLE: ICD-10-CM

## 2019-12-04 RX ORDER — INDAPAMIDE 2.5 MG/1
TABLET, FILM COATED ORAL
Qty: 90 TABLET | Refills: 0 | Status: SHIPPED | OUTPATIENT
Start: 2019-12-04 | End: 2020-03-16

## 2019-12-05 RX ORDER — MEDROXYPROGESTERONE ACETATE 150 MG/ML
INJECTION, SUSPENSION INTRAMUSCULAR
Qty: 1 ML | Refills: 0 | Status: SHIPPED | OUTPATIENT
Start: 2019-12-05 | End: 2020-03-24

## 2019-12-06 ENCOUNTER — CLINICAL SUPPORT (OUTPATIENT)
Dept: OBSTETRICS AND GYNECOLOGY | Facility: CLINIC | Age: 44
End: 2019-12-06

## 2019-12-06 DIAGNOSIS — Z30.42 ENCOUNTER FOR MANAGEMENT AND INJECTION OF DEPO-PROVERA: Primary | ICD-10-CM

## 2019-12-06 PROCEDURE — 96372 THER/PROPH/DIAG INJ SC/IM: CPT | Performed by: OBSTETRICS & GYNECOLOGY

## 2019-12-06 RX ORDER — MEDROXYPROGESTERONE ACETATE 150 MG/ML
150 INJECTION, SUSPENSION INTRAMUSCULAR ONCE
Status: COMPLETED | OUTPATIENT
Start: 2019-12-06 | End: 2019-12-06

## 2019-12-06 RX ADMIN — MEDROXYPROGESTERONE ACETATE 150 MG: 150 INJECTION, SUSPENSION INTRAMUSCULAR at 09:42

## 2019-12-06 NOTE — PATIENT INSTRUCTIONS
Pt here for pt supplied depo injection. Pt tolerated injection without a reaction. Left deltoid    NDC:[address]-2  LOT:[id number]  EXP:08/2021

## 2019-12-06 NOTE — PROGRESS NOTES
Pt here for pt supplied depo injection. Pt tolerated injection without a reaction. Left deltoid    NDC:[address]-6  LOT:[id number]  EXP:08/2021

## 2019-12-15 DIAGNOSIS — F32.89 OTHER DEPRESSION: ICD-10-CM

## 2019-12-16 RX ORDER — VENLAFAXINE 75 MG/1
TABLET ORAL
Qty: 120 TABLET | Refills: 0 | Status: SHIPPED | OUTPATIENT
Start: 2019-12-16 | End: 2020-01-22

## 2020-01-06 ENCOUNTER — OFFICE VISIT (OUTPATIENT)
Dept: FAMILY MEDICINE CLINIC | Facility: CLINIC | Age: 45
End: 2020-01-06

## 2020-01-06 VITALS
DIASTOLIC BLOOD PRESSURE: 70 MMHG | HEIGHT: 60 IN | HEART RATE: 78 BPM | BODY MASS INDEX: 40.25 KG/M2 | SYSTOLIC BLOOD PRESSURE: 122 MMHG | RESPIRATION RATE: 14 BRPM | WEIGHT: 205 LBS | OXYGEN SATURATION: 97 %

## 2020-01-06 DIAGNOSIS — R39.9 UTI SYMPTOMS: Primary | ICD-10-CM

## 2020-01-06 LAB
BILIRUB BLD-MCNC: NEGATIVE MG/DL
CLARITY, POC: CLEAR
COLOR UR: YELLOW
GLUCOSE UR STRIP-MCNC: NEGATIVE MG/DL
KETONES UR QL: NEGATIVE
LEUKOCYTE EST, POC: ABNORMAL
NITRITE UR-MCNC: POSITIVE MG/ML
PH UR: 7.5 [PH] (ref 5–8)
PROT UR STRIP-MCNC: NEGATIVE MG/DL
RBC # UR STRIP: ABNORMAL /UL
SP GR UR: 1.01 (ref 1–1.03)
UROBILINOGEN UR QL: NORMAL

## 2020-01-06 PROCEDURE — 81002 URINALYSIS NONAUTO W/O SCOPE: CPT | Performed by: NURSE PRACTITIONER

## 2020-01-06 PROCEDURE — 99213 OFFICE O/P EST LOW 20 MIN: CPT | Performed by: NURSE PRACTITIONER

## 2020-01-06 RX ORDER — NITROFURANTOIN 25; 75 MG/1; MG/1
100 CAPSULE ORAL 2 TIMES DAILY
Qty: 10 CAPSULE | Refills: 0 | Status: SHIPPED | OUTPATIENT
Start: 2020-01-06 | End: 2020-01-11

## 2020-01-06 RX ORDER — SULFAMETHOXAZOLE AND TRIMETHOPRIM 400; 80 MG/1; MG/1
TABLET ORAL
COMMUNITY
Start: 2019-12-15

## 2020-01-06 NOTE — PROGRESS NOTES
Subjective   Rehana Cruz is a 44 y.o. female. She is a patient of Dr. De La Rosa, but is new to me.     History of Present Illness   Patient here for UTI patient complains of urgency and frequency that started three days ago. She takes Bactrim daily. Pt states that she's had some fever and flank pain. She has not taken any over the counter medications for her symptoms. She does self cath is is prone to UTI's.     The following portions of the patient's history were reviewed and updated as appropriate: allergies, current medications, past family history, past medical history, past social history, past surgical history and problem list.    Review of Systems   Constitutional: Negative for chills, fatigue and fever.   Respiratory: Negative for cough and shortness of breath.    Cardiovascular: Negative for chest pain, palpitations and leg swelling.   Gastrointestinal: Positive for nausea. Negative for abdominal pain.   Genitourinary: Positive for dysuria, flank pain, frequency and urgency. Negative for hematuria.   Neurological: Negative for dizziness, weakness and headache.   Hematological: Negative.    Psychiatric/Behavioral: Negative for agitation, behavioral problems and hallucinations.       Objective   Physical Exam   Constitutional: She is oriented to person, place, and time. She appears well-developed and well-nourished.   HENT:   Head: Normocephalic and atraumatic.   Right Ear: External ear normal.   Left Ear: External ear normal.   Nose: Nose normal.   Mouth/Throat: Oropharynx is clear and moist.   Eyes: Pupils are equal, round, and reactive to light. Conjunctivae and EOM are normal.   Neck: Normal range of motion. Neck supple. No thyromegaly present.   Cardiovascular: Normal rate, regular rhythm, normal heart sounds and intact distal pulses.   No murmur heard.  Pulmonary/Chest: Effort normal and breath sounds normal.   Abdominal: Soft. Bowel sounds are normal. She exhibits no distension. There is no  tenderness.   Musculoskeletal: Normal range of motion. She exhibits tenderness. She exhibits no edema or deformity.   Left flank pain with palpation.    Lymphadenopathy:     She has no cervical adenopathy.   Neurological: She is alert and oriented to person, place, and time. No cranial nerve deficit.   Skin: Skin is warm and dry.   Psychiatric: She has a normal mood and affect. Her behavior is normal. Judgment and thought content normal.   Nursing note and vitals reviewed.      Vitals:    01/06/20 1405   BP: 122/70   Pulse: 78   Resp: 14   SpO2: 97%     Body mass index is 40.04 kg/m².    Procedures    Assessment/Plan   Problems Addressed this Visit     None      Visit Diagnoses     UTI symptoms    -  Primary    Relevant Medications    nitrofurantoin, macrocrystal-monohydrate, (MACROBID) 100 MG capsule    Other Relevant Orders    Urine Culture - Urine, Urine, Clean Catch    POC Urinalysis Dipstick (Completed)        Hold Bactrim while taking macrobid.               Patient Instructions   Hold Bactrim, while taking Macrobid.  Drink plenty of fluids.  May use Azo over the counter as directed.   Return if symptoms worsen or fail to improve.      Urinary Tract Infection, Adult  A urinary tract infection (UTI) is an infection of any part of the urinary tract. The urinary tract includes:  · The kidneys.  · The ureters.  · The bladder.  · The urethra.  These organs make, store, and get rid of pee (urine) in the body.  What are the causes?  This is caused by germs (bacteria) in your genital area. These germs grow and cause swelling (inflammation) of your urinary tract.  What increases the risk?  You are more likely to develop this condition if:  · You have a small, thin tube (catheter) to drain pee.  · You cannot control when you pee or poop (incontinence).  · You are female, and:  ? You use these methods to prevent pregnancy:  ? A medicine that kills sperm (spermicide).  ? A device that blocks sperm (diaphragm).  ? You have  low levels of a female hormone (estrogen).  ? You are pregnant.  · You have genes that add to your risk.  · You are sexually active.  · You take antibiotic medicines.  · You have trouble peeing because of:  ? A prostate that is bigger than normal, if you are male.  ? A blockage in the part of your body that drains pee from the bladder (urethra).  ? A kidney stone.  ? A nerve condition that affects your bladder (neurogenic bladder).  ? Not getting enough to drink.  ? Not peeing often enough.  · You have other conditions, such as:  ? Diabetes.  ? A weak disease-fighting system (immune system).  ? Sickle cell disease.  ? Gout.  ? Injury of the spine.  What are the signs or symptoms?  Symptoms of this condition include:  · Needing to pee right away (urgently).  · Peeing often.  · Peeing small amounts often.  · Pain or burning when peeing.  · Blood in the pee.  · Pee that smells bad or not like normal.  · Trouble peeing.  · Pee that is cloudy.  · Fluid coming from the vagina, if you are female.  · Pain in the belly or lower back.  Other symptoms include:  · Throwing up (vomiting).  · No urge to eat.  · Feeling mixed up (confused).  · Being tired and grouchy (irritable).  · A fever.  · Watery poop (diarrhea).  How is this treated?  This condition may be treated with:  · Antibiotic medicine.  · Other medicines.  · Drinking enough water.  Follow these instructions at home:    Medicines  · Take over-the-counter and prescription medicines only as told by your doctor.  · If you were prescribed an antibiotic medicine, take it as told by your doctor. Do not stop taking it even if you start to feel better.  General instructions  · Make sure you:  ? Pee until your bladder is empty.   ? Do not hold pee for a long time.  ? Empty your bladder after sex.  ? Wipe from front to back after pooping if you are a female. Use each tissue one time when you wipe.  · Drink enough fluid to keep your pee pale yellow.  · Keep all follow-up visits  as told by your doctor. This is important.  Contact a doctor if:  · You do not get better after 1-2 days.  · Your symptoms go away and then come back.  Get help right away if:  · You have very bad back pain.  · You have very bad pain in your lower belly.  · You have a fever.  · You are sick to your stomach (nauseous).  · You are throwing up.  Summary  · A urinary tract infection (UTI) is an infection of any part of the urinary tract.  · This condition is caused by germs in your genital area.  · There are many risk factors for a UTI. These include having a small, thin tube to drain pee and not being able to control when you pee or poop.  · Treatment includes antibiotic medicines for germs.  · Drink enough fluid to keep your pee pale yellow.  This information is not intended to replace advice given to you by your health care provider. Make sure you discuss any questions you have with your health care provider.  Document Released: 06/05/2009 Document Revised: 06/27/2019 Document Reviewed: 06/27/2019  [employer] Interactive Patient Education © 2019 [employer]

## 2020-01-06 NOTE — PATIENT INSTRUCTIONS
Hold Bactrim, while taking Macrobid.  Drink plenty of fluids.  May use Azo over the counter as directed.   Return if symptoms worsen or fail to improve.      Urinary Tract Infection, Adult  A urinary tract infection (UTI) is an infection of any part of the urinary tract. The urinary tract includes:  · The kidneys.  · The ureters.  · The bladder.  · The urethra.  These organs make, store, and get rid of pee (urine) in the body.  What are the causes?  This is caused by germs (bacteria) in your genital area. These germs grow and cause swelling (inflammation) of your urinary tract.  What increases the risk?  You are more likely to develop this condition if:  · You have a small, thin tube (catheter) to drain pee.  · You cannot control when you pee or poop (incontinence).  · You are female, and:  ? You use these methods to prevent pregnancy:  ? A medicine that kills sperm (spermicide).  ? A device that blocks sperm (diaphragm).  ? You have low levels of a female hormone (estrogen).  ? You are pregnant.  · You have genes that add to your risk.  · You are sexually active.  · You take antibiotic medicines.  · You have trouble peeing because of:  ? A prostate that is bigger than normal, if you are male.  ? A blockage in the part of your body that drains pee from the bladder (urethra).  ? A kidney stone.  ? A nerve condition that affects your bladder (neurogenic bladder).  ? Not getting enough to drink.  ? Not peeing often enough.  · You have other conditions, such as:  ? Diabetes.  ? A weak disease-fighting system (immune system).  ? Sickle cell disease.  ? Gout.  ? Injury of the spine.  What are the signs or symptoms?  Symptoms of this condition include:  · Needing to pee right away (urgently).  · Peeing often.  · Peeing small amounts often.  · Pain or burning when peeing.  · Blood in the pee.  · Pee that smells bad or not like normal.  · Trouble peeing.  · Pee that is cloudy.  · Fluid coming from the vagina, if you are  female.  · Pain in the belly or lower back.  Other symptoms include:  · Throwing up (vomiting).  · No urge to eat.  · Feeling mixed up (confused).  · Being tired and grouchy (irritable).  · A fever.  · Watery poop (diarrhea).  How is this treated?  This condition may be treated with:  · Antibiotic medicine.  · Other medicines.  · Drinking enough water.  Follow these instructions at home:    Medicines  · Take over-the-counter and prescription medicines only as told by your doctor.  · If you were prescribed an antibiotic medicine, take it as told by your doctor. Do not stop taking it even if you start to feel better.  General instructions  · Make sure you:  ? Pee until your bladder is empty.   ? Do not hold pee for a long time.  ? Empty your bladder after sex.  ? Wipe from front to back after pooping if you are a female. Use each tissue one time when you wipe.  · Drink enough fluid to keep your pee pale yellow.  · Keep all follow-up visits as told by your doctor. This is important.  Contact a doctor if:  · You do not get better after 1-2 days.  · Your symptoms go away and then come back.  Get help right away if:  · You have very bad back pain.  · You have very bad pain in your lower belly.  · You have a fever.  · You are sick to your stomach (nauseous).  · You are throwing up.  Summary  · A urinary tract infection (UTI) is an infection of any part of the urinary tract.  · This condition is caused by germs in your genital area.  · There are many risk factors for a UTI. These include having a small, thin tube to drain pee and not being able to control when you pee or poop.  · Treatment includes antibiotic medicines for germs.  · Drink enough fluid to keep your pee pale yellow.  This information is not intended to replace advice given to you by your health care provider. Make sure you discuss any questions you have with your health care provider.  Document Released: 06/05/2009 Document Revised: 06/27/2019 Document  Reviewed: 06/27/2019  [employer] Interactive Patient Education © 2019 [employer]

## 2020-01-09 LAB
BACTERIA UR CULT: ABNORMAL
BACTERIA UR CULT: ABNORMAL
OTHER ANTIBIOTIC SUSC ISLT: ABNORMAL

## 2020-01-22 DIAGNOSIS — F32.89 OTHER DEPRESSION: ICD-10-CM

## 2020-01-22 RX ORDER — VENLAFAXINE 75 MG/1
TABLET ORAL
Qty: 120 TABLET | Refills: 0 | Status: SHIPPED | OUTPATIENT
Start: 2020-01-22 | End: 2020-03-02

## 2020-01-22 RX ORDER — AMLODIPINE BESYLATE 5 MG/1
TABLET ORAL
Qty: 90 TABLET | Refills: 0 | Status: SHIPPED | OUTPATIENT
Start: 2020-01-22 | End: 2020-03-16

## 2020-03-02 DIAGNOSIS — F32.89 OTHER DEPRESSION: ICD-10-CM

## 2020-03-02 RX ORDER — VENLAFAXINE 75 MG/1
TABLET ORAL
Qty: 120 TABLET | Refills: 0 | Status: SHIPPED | OUTPATIENT
Start: 2020-03-02 | End: 2020-03-25

## 2020-03-16 DIAGNOSIS — I10 ESSENTIAL HYPERTENSION: ICD-10-CM

## 2020-03-16 RX ORDER — INDAPAMIDE 2.5 MG/1
TABLET, FILM COATED ORAL
Qty: 90 TABLET | Refills: 0 | Status: SHIPPED | OUTPATIENT
Start: 2020-03-16 | End: 2020-07-06

## 2020-03-16 RX ORDER — AMLODIPINE BESYLATE 5 MG/1
TABLET ORAL
Qty: 90 TABLET | Refills: 0 | Status: SHIPPED | OUTPATIENT
Start: 2020-03-16 | End: 2020-07-20

## 2020-03-23 DIAGNOSIS — N92.0 MENORRHAGIA WITH REGULAR CYCLE: ICD-10-CM

## 2020-03-24 ENCOUNTER — OFFICE VISIT (OUTPATIENT)
Dept: OBSTETRICS AND GYNECOLOGY | Facility: CLINIC | Age: 45
End: 2020-03-24

## 2020-03-24 VITALS
HEART RATE: 93 BPM | DIASTOLIC BLOOD PRESSURE: 92 MMHG | WEIGHT: 205 LBS | BODY MASS INDEX: 40.04 KG/M2 | SYSTOLIC BLOOD PRESSURE: 133 MMHG

## 2020-03-24 DIAGNOSIS — Z01.419 ENCOUNTER FOR GYNECOLOGICAL EXAMINATION: Primary | ICD-10-CM

## 2020-03-24 PROCEDURE — G0101 CA SCREEN;PELVIC/BREAST EXAM: HCPCS | Performed by: OBSTETRICS & GYNECOLOGY

## 2020-03-24 RX ORDER — MEDROXYPROGESTERONE ACETATE 150 MG/ML
INJECTION, SUSPENSION INTRAMUSCULAR
Qty: 1 ML | Refills: 0 | Status: SHIPPED | OUTPATIENT
Start: 2020-03-24 | End: 2021-09-15

## 2020-03-24 NOTE — PROGRESS NOTES
Subjective   Rehana Cruz is a 44 y.o. female here for annual exam. Pap- 2018    History of Present Illness   Patient is a 44-year-old female that presents for annual gynecological exam.  She has no complaints.  She currently uses Depo-Provera and reports amenorrhea.  Patient is currently up-to-date on her screening Pap smear, but is due for mammogram.    The following portions of the patient's history were reviewed and updated as appropriate: allergies, current medications, past family history, past medical history, past social history, past surgical history and problem list.    Review of Systems   Genitourinary: Negative for menstrual problem and pelvic pain.   All other systems reviewed and are negative.      Objective   Physical Exam   Constitutional: She appears well-developed and well-nourished.   Cardiovascular: Normal rate and regular rhythm.   Pulmonary/Chest: Effort normal and breath sounds normal. Right breast exhibits no inverted nipple, no mass, no nipple discharge, no skin change and no tenderness. Left breast exhibits no inverted nipple, no mass, no nipple discharge, no skin change and no tenderness.   Abdominal: Soft. She exhibits no distension. There is no tenderness.   Genitourinary: Vagina normal, uterus normal and cervix normal. There is no rash, tenderness, lesion or injury on the right labia. There is no rash, tenderness, lesion or injury on the left labia. Right adnexum displays no mass, no tenderness and no fullness. Left adnexum displays no mass, no tenderness and no fullness.   Neurological: She is alert.   Psychiatric: She has a normal mood and affect.   Vitals reviewed.        Assessment/Plan   Diagnoses and all orders for this visit:    Encounter for gynecological examination    Patient was counseled on monthly self breast exams for breast health.  She will continue on Depo-Provera.  She may follow-up in 1 year for next annual gynecological exam or sooner if needed.

## 2020-03-25 DIAGNOSIS — F32.89 OTHER DEPRESSION: ICD-10-CM

## 2020-03-25 RX ORDER — VENLAFAXINE 75 MG/1
TABLET ORAL
Qty: 120 TABLET | Refills: 0 | Status: SHIPPED | OUTPATIENT
Start: 2020-03-25 | End: 2020-05-26

## 2020-05-20 ENCOUNTER — CLINICAL SUPPORT (OUTPATIENT)
Dept: OBSTETRICS AND GYNECOLOGY | Facility: CLINIC | Age: 45
End: 2020-05-20

## 2020-05-20 DIAGNOSIS — Z30.42 ENCOUNTER FOR MANAGEMENT AND INJECTION OF DEPO-PROVERA: Primary | ICD-10-CM

## 2020-05-20 PROCEDURE — 96372 THER/PROPH/DIAG INJ SC/IM: CPT | Performed by: OBSTETRICS & GYNECOLOGY

## 2020-05-20 RX ORDER — MEDROXYPROGESTERONE ACETATE 150 MG/ML
150 INJECTION, SUSPENSION INTRAMUSCULAR ONCE
Status: COMPLETED | OUTPATIENT
Start: 2020-05-20 | End: 2020-05-20

## 2020-05-20 RX ADMIN — MEDROXYPROGESTERONE ACETATE 150 MG: 150 INJECTION, SUSPENSION INTRAMUSCULAR at 14:43

## 2020-05-20 NOTE — PROGRESS NOTES
CC: Pt here for Depo injection. LT D    Lot#:[id number]   Exp:2/2022  NDC:[address]-1    Pt tolerated injection

## 2020-05-24 DIAGNOSIS — F32.89 OTHER DEPRESSION: ICD-10-CM

## 2020-05-26 RX ORDER — VENLAFAXINE 75 MG/1
TABLET ORAL
Qty: 120 TABLET | Refills: 0 | Status: SHIPPED | OUTPATIENT
Start: 2020-05-26 | End: 2020-07-09

## 2020-06-19 ENCOUNTER — OFFICE VISIT (OUTPATIENT)
Dept: FAMILY MEDICINE CLINIC | Facility: CLINIC | Age: 45
End: 2020-06-19

## 2020-06-19 VITALS
OXYGEN SATURATION: 98 % | HEART RATE: 72 BPM | SYSTOLIC BLOOD PRESSURE: 124 MMHG | DIASTOLIC BLOOD PRESSURE: 90 MMHG | RESPIRATION RATE: 16 BRPM

## 2020-06-19 DIAGNOSIS — H61.22 IMPACTED CERUMEN OF LEFT EAR: ICD-10-CM

## 2020-06-19 DIAGNOSIS — Q05.7 SPINA BIFIDA OF LUMBOSACRAL REGION WITHOUT HYDROCEPHALUS (HCC): ICD-10-CM

## 2020-06-19 DIAGNOSIS — L02.415 ABSCESS OF RIGHT THIGH: Primary | ICD-10-CM

## 2020-06-19 PROCEDURE — 99214 OFFICE O/P EST MOD 30 MIN: CPT | Performed by: FAMILY MEDICINE

## 2020-06-19 PROCEDURE — 69209 REMOVE IMPACTED EAR WAX UNI: CPT | Performed by: FAMILY MEDICINE

## 2020-06-19 NOTE — PROGRESS NOTES
Subjective   Rehana Cruz is a 44 y.o. female.   Wound Infection and Cerumen Impaction    History of Present Illness   Rehana is here w/ a wound on her Rt upper posterior thigh.  She states she has been watching it for a month but just recently became inflamed and tender.  Rehana is also c/o cerumen impaction Lt ear.  Rehana notes that she had MRSA about 20 years ago following a wound infection from a an ulcer.  This makes her little nervous.  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  She is asking us to clean out her impacted ear.  The following portions of the patient's history were reviewed and updated as appropriate: allergies, current medications, past family history, past medical history, past social history, past surgical history and problem list.    Review of Systems   Constitutional: Negative.    HENT: Positive for ear pain.    Eyes: Negative.    Gastrointestinal: Negative.    Genitourinary: Negative.    Musculoskeletal:        She is partially paralyzed from the waist down and is mobile via wheelchair   Skin: Positive for skin lesions.   Psychiatric/Behavioral: Negative.        Objective   Physical Exam   Constitutional: She is oriented to person, place, and time. She appears well-developed and well-nourished.   HENT:   Head: Normocephalic and atraumatic.   Left ear impacted with cerumen   Eyes: Pupils are equal, round, and reactive to light. EOM are normal.   Neck: Normal range of motion.   Cardiovascular: Normal rate and regular rhythm.   Pulmonary/Chest: Effort normal.   Neurological: She is alert and oriented to person, place, and time.   Skin: Skin is warm and dry. No rash noted.   Right posterior upper thigh has a 2 x 3 cm area of tenderness and induration, the entire back of the thigh is warm to the touch and tender   Psychiatric: She  has a normal mood and affect. Her behavior is normal.   Nursing note and vitals reviewed.        Assessment/Plan   Problem List Items Addressed This Visit        Nervous and Auditory    Spina bifida (CMS/HCC)    Overview     manages ADLs well, drives and lives alone           Other Visit Diagnoses     Abscess of right thigh    -  Primary since this is Friday afternoon I am concerned that if we do not address this quickly will be in trouble.  I have referred her to Dr. Mesa's practice for evaluation and treatment.  I am concerned this needs to be debrided and packed and she will definitely need to be placed on antibiotics.      Impacted cerumen of left ear      Ear  successfully cleaned               No follow-ups on file.

## 2020-07-04 DIAGNOSIS — I10 ESSENTIAL HYPERTENSION: ICD-10-CM

## 2020-07-05 DIAGNOSIS — I10 ESSENTIAL HYPERTENSION: ICD-10-CM

## 2020-07-06 RX ORDER — INDAPAMIDE 2.5 MG/1
TABLET, FILM COATED ORAL
Qty: 90 TABLET | Refills: 0 | Status: SHIPPED | OUTPATIENT
Start: 2020-07-06 | End: 2020-12-22

## 2020-07-06 RX ORDER — INDAPAMIDE 2.5 MG/1
TABLET, FILM COATED ORAL
Qty: 90 TABLET | Refills: 0 | Status: SHIPPED | OUTPATIENT
Start: 2020-07-06 | End: 2020-12-22 | Stop reason: SDUPTHER

## 2020-07-08 DIAGNOSIS — F32.89 OTHER DEPRESSION: ICD-10-CM

## 2020-07-09 RX ORDER — VENLAFAXINE 75 MG/1
TABLET ORAL
Qty: 120 TABLET | Refills: 1 | Status: SHIPPED | OUTPATIENT
Start: 2020-07-09 | End: 2020-09-28

## 2020-07-20 RX ORDER — AMLODIPINE BESYLATE 5 MG/1
TABLET ORAL
Qty: 90 TABLET | Refills: 0 | Status: SHIPPED | OUTPATIENT
Start: 2020-07-20 | End: 2020-10-30 | Stop reason: SDUPTHER

## 2020-07-27 DIAGNOSIS — Q05.7 SPINA BIFIDA OF LUMBOSACRAL REGION WITHOUT HYDROCEPHALUS (HCC): Primary | ICD-10-CM

## 2020-08-03 ENCOUNTER — TELEPHONE (OUTPATIENT)
Dept: OBSTETRICS AND GYNECOLOGY | Facility: CLINIC | Age: 45
End: 2020-08-03

## 2020-08-03 RX ORDER — MEDROXYPROGESTERONE ACETATE 150 MG/ML
150 INJECTION, SUSPENSION INTRAMUSCULAR
Qty: 1 ML | Refills: 3 | Status: SHIPPED | OUTPATIENT
Start: 2020-08-03 | End: 2021-06-09

## 2020-08-03 NOTE — TELEPHONE ENCOUNTER
Patient would like a refill of her Depo sent to     [facility] - [place], [place] - [address] AT [address] - [phone]  - [phone] (Phone)  [phone] (Fax)

## 2020-08-05 ENCOUNTER — CLINICAL SUPPORT (OUTPATIENT)
Dept: OBSTETRICS AND GYNECOLOGY | Facility: CLINIC | Age: 45
End: 2020-08-05

## 2020-08-05 DIAGNOSIS — Z30.42 ENCOUNTER FOR MANAGEMENT AND INJECTION OF DEPO-PROVERA: Primary | ICD-10-CM

## 2020-08-05 PROCEDURE — 96372 THER/PROPH/DIAG INJ SC/IM: CPT | Performed by: OBSTETRICS & GYNECOLOGY

## 2020-08-05 RX ORDER — MEDROXYPROGESTERONE ACETATE 150 MG/ML
150 INJECTION, SUSPENSION INTRAMUSCULAR ONCE
Status: COMPLETED | OUTPATIENT
Start: 2020-08-05 | End: 2020-08-05

## 2020-08-05 RX ADMIN — MEDROXYPROGESTERONE ACETATE 150 MG: 150 INJECTION, SUSPENSION INTRAMUSCULAR at 10:33

## 2020-08-05 NOTE — PATIENT INSTRUCTIONS
Patient is here for a patient supplied depo injection. Patient tolerated injection without reaction.

## 2020-09-27 DIAGNOSIS — F32.89 OTHER DEPRESSION: ICD-10-CM

## 2020-09-28 RX ORDER — VENLAFAXINE 75 MG/1
TABLET ORAL
Qty: 120 TABLET | Refills: 1 | Status: SHIPPED | OUTPATIENT
Start: 2020-09-28 | End: 2020-12-21

## 2020-10-30 RX ORDER — AMLODIPINE BESYLATE 5 MG/1
5 TABLET ORAL DAILY
Qty: 90 TABLET | Refills: 1 | Status: SHIPPED | OUTPATIENT
Start: 2020-10-30 | End: 2021-05-10

## 2020-12-01 DIAGNOSIS — R39.9 UTI SYMPTOMS: Primary | ICD-10-CM

## 2020-12-01 RX ORDER — NITROFURANTOIN 25; 75 MG/1; MG/1
100 CAPSULE ORAL 2 TIMES DAILY
Qty: 10 CAPSULE | Refills: 0 | Status: SHIPPED | OUTPATIENT
Start: 2020-12-01 | End: 2020-12-06

## 2020-12-14 ENCOUNTER — OFFICE VISIT (OUTPATIENT)
Dept: FAMILY MEDICINE CLINIC | Facility: CLINIC | Age: 45
End: 2020-12-14

## 2020-12-14 VITALS
SYSTOLIC BLOOD PRESSURE: 140 MMHG | RESPIRATION RATE: 16 BRPM | OXYGEN SATURATION: 98 % | WEIGHT: 210 LBS | BODY MASS INDEX: 41.01 KG/M2 | HEART RATE: 107 BPM | TEMPERATURE: 96.8 F | DIASTOLIC BLOOD PRESSURE: 82 MMHG

## 2020-12-14 DIAGNOSIS — R35.0 FREQUENCY OF URINATION: Primary | ICD-10-CM

## 2020-12-14 LAB
BILIRUB BLD-MCNC: NEGATIVE MG/DL
CLARITY, POC: CLEAR
COLOR UR: ABNORMAL
GLUCOSE UR STRIP-MCNC: NEGATIVE MG/DL
KETONES UR QL: ABNORMAL
LEUKOCYTE EST, POC: NEGATIVE
NITRITE UR-MCNC: POSITIVE MG/ML
PH UR: 7.5 [PH] (ref 5–8)
PROT UR STRIP-MCNC: ABNORMAL MG/DL
RBC # UR STRIP: ABNORMAL /UL
SP GR UR: 1.01 (ref 1–1.03)
UROBILINOGEN UR QL: NORMAL

## 2020-12-14 PROCEDURE — 99213 OFFICE O/P EST LOW 20 MIN: CPT | Performed by: NURSE PRACTITIONER

## 2020-12-14 PROCEDURE — 81003 URINALYSIS AUTO W/O SCOPE: CPT | Performed by: NURSE PRACTITIONER

## 2020-12-14 RX ORDER — CIPROFLOXACIN 500 MG/1
500 TABLET, FILM COATED ORAL 2 TIMES DAILY
Qty: 6 TABLET | Refills: 0 | Status: SHIPPED | OUTPATIENT
Start: 2020-12-14 | End: 2020-12-17

## 2020-12-14 NOTE — PATIENT INSTRUCTIONS
I will call you with your urine culture results.  Return if symptoms worsen or fail to improve.  May use over the counter fiber to help with diarrhea.     Urinary Tract Infection, Adult  A urinary tract infection (UTI) is an infection of any part of the urinary tract. The urinary tract includes:  · The kidneys.  · The ureters.  · The bladder.  · The urethra.  These organs make, store, and get rid of pee (urine) in the body.  What are the causes?  This is caused by germs (bacteria) in your genital area. These germs grow and cause swelling (inflammation) of your urinary tract.  What increases the risk?  You are more likely to develop this condition if:  · You have a small, thin tube (catheter) to drain pee.  · You cannot control when you pee or poop (incontinence).  · You are female, and:  ? You use these methods to prevent pregnancy:  § A medicine that kills sperm (spermicide).  § A device that blocks sperm (diaphragm).  ? You have low levels of a female hormone (estrogen).  ? You are pregnant.  · You have genes that add to your risk.  · You are sexually active.  · You take antibiotic medicines.  · You have trouble peeing because of:  ? A prostate that is bigger than normal, if you are male.  ? A blockage in the part of your body that drains pee from the bladder (urethra).  ? A kidney stone.  ? A nerve condition that affects your bladder (neurogenic bladder).  ? Not getting enough to drink.  ? Not peeing often enough.  · You have other conditions, such as:  ? Diabetes.  ? A weak disease-fighting system (immune system).  ? Sickle cell disease.  ? Gout.  ? Injury of the spine.  What are the signs or symptoms?  Symptoms of this condition include:  · Needing to pee right away (urgently).  · Peeing often.  · Peeing small amounts often.  · Pain or burning when peeing.  · Blood in the pee.  · Pee that smells bad or not like normal.  · Trouble peeing.  · Pee that is cloudy.  · Fluid coming from the vagina, if you are  female.  · Pain in the belly or lower back.  Other symptoms include:  · Throwing up (vomiting).  · No urge to eat.  · Feeling mixed up (confused).  · Being tired and grouchy (irritable).  · A fever.  · Watery poop (diarrhea).  How is this treated?  This condition may be treated with:  · Antibiotic medicine.  · Other medicines.  · Drinking enough water.  Follow these instructions at home:    Medicines  · Take over-the-counter and prescription medicines only as told by your doctor.  · If you were prescribed an antibiotic medicine, take it as told by your doctor. Do not stop taking it even if you start to feel better.  General instructions  · Make sure you:  ? Pee until your bladder is empty.  ? Do not hold pee for a long time.  ? Empty your bladder after sex.  ? Wipe from front to back after pooping if you are a female. Use each tissue one time when you wipe.  · Drink enough fluid to keep your pee pale yellow.  · Keep all follow-up visits as told by your doctor. This is important.  Contact a doctor if:  · You do not get better after 1-2 days.  · Your symptoms go away and then come back.  Get help right away if:  · You have very bad back pain.  · You have very bad pain in your lower belly.  · You have a fever.  · You are sick to your stomach (nauseous).  · You are throwing up.  Summary  · A urinary tract infection (UTI) is an infection of any part of the urinary tract.  · This condition is caused by germs in your genital area.  · There are many risk factors for a UTI. These include having a small, thin tube to drain pee and not being able to control when you pee or poop.  · Treatment includes antibiotic medicines for germs.  · Drink enough fluid to keep your pee pale yellow.  This information is not intended to replace advice given to you by your health care provider. Make sure you discuss any questions you have with your health care provider.  Document Revised: 12/05/2019 Document Reviewed: 06/27/2019  [email or web address]  Patient Education © 2020 [employer]

## 2020-12-14 NOTE — PROGRESS NOTES
"Subjective   Rehana Cruz is a 44 y.o. female. She is a patient of Dr. De La Rosa, but is new to me.     History of Present Illness   Patient presents with c/o urinary frequency, incontinence and foul odor that started about \"13 days ago\". Patient was prescribed macrobid, which she finished, however, she continues to have the same symptoms. Patient denies any blood in her urine, but is having urinary discomfort. Patient reports diarrhea since taking the macrobid.       The following portions of the patient's history were reviewed and updated as appropriate: allergies, current medications, past family history, past medical history, past social history, past surgical history and problem list.    Review of Systems   Constitutional: Negative for chills, fatigue and fever.   Respiratory: Negative for cough and shortness of breath.    Cardiovascular: Negative for chest pain, palpitations and leg swelling.   Gastrointestinal: Positive for diarrhea.   Genitourinary: Positive for dysuria, frequency and urinary incontinence. Negative for flank pain, hematuria, vaginal bleeding, vaginal discharge and vaginal pain.   Neurological: Negative for dizziness, weakness and headache.   Hematological: Negative.    Psychiatric/Behavioral: Negative for agitation, behavioral problems and hallucinations.       Objective   Physical Exam  Vitals signs and nursing note reviewed.   Constitutional:       Appearance: She is well-developed.   HENT:      Head: Normocephalic and atraumatic.      Right Ear: External ear normal.      Left Ear: External ear normal.      Nose: Nose normal.      Mouth/Throat:      Pharynx: No oropharyngeal exudate.   Eyes:      General:         Right eye: No discharge.         Left eye: No discharge.      Conjunctiva/sclera: Conjunctivae normal.      Pupils: Pupils are equal, round, and reactive to light.   Neck:      Musculoskeletal: Normal range of motion and neck supple.      Thyroid: No thyromegaly.   Cardiovascular: "      Rate and Rhythm: Normal rate and regular rhythm.      Heart sounds: Normal heart sounds. No murmur.   Pulmonary:      Effort: Pulmonary effort is normal. No tachypnea or respiratory distress.      Breath sounds: Normal breath sounds. No decreased breath sounds, wheezing or rales.   Lymphadenopathy:      Cervical: No cervical adenopathy.   Skin:     General: Skin is warm and dry.   Neurological:      Mental Status: She is alert and oriented to person, place, and time.   Psychiatric:         Behavior: Behavior normal.         Thought Content: Thought content normal.         Judgment: Judgment normal.         Vitals:    12/14/20 1119   BP: 140/82   Pulse: 107   Resp: 16   Temp: 96.8 °F (36 °C)   SpO2: 98%     Body mass index is 41.01 kg/m².    Procedures    Assessment/Plan   Problems Addressed this Visit     None      Visit Diagnoses     Frequency of urination    -  Primary    Relevant Medications    ciprofloxacin (Cipro) 500 MG tablet    Other Relevant Orders    POCT urinalysis dipstick, automated (Completed)    Urine Culture - Urine, Urine, Clean Catch      Diagnoses       Codes Comments    Frequency of urination    -  Primary ICD-10-CM: R35.0  ICD-9-CM: 788.41         Return if symptoms worsen or fail to improve.  Advised patient to use fiber to help with her diarrhea.          Patient Instructions   I will call you with your urine culture results.  Return if symptoms worsen or fail to improve.    Urinary Tract Infection, Adult  A urinary tract infection (UTI) is an infection of any part of the urinary tract. The urinary tract includes:  · The kidneys.  · The ureters.  · The bladder.  · The urethra.  These organs make, store, and get rid of pee (urine) in the body.  What are the causes?  This is caused by germs (bacteria) in your genital area. These germs grow and cause swelling (inflammation) of your urinary tract.  What increases the risk?  You are more likely to develop this condition if:  · You have a  small, thin tube (catheter) to drain pee.  · You cannot control when you pee or poop (incontinence).  · You are female, and:  ? You use these methods to prevent pregnancy:  § A medicine that kills sperm (spermicide).  § A device that blocks sperm (diaphragm).  ? You have low levels of a female hormone (estrogen).  ? You are pregnant.  · You have genes that add to your risk.  · You are sexually active.  · You take antibiotic medicines.  · You have trouble peeing because of:  ? A prostate that is bigger than normal, if you are male.  ? A blockage in the part of your body that drains pee from the bladder (urethra).  ? A kidney stone.  ? A nerve condition that affects your bladder (neurogenic bladder).  ? Not getting enough to drink.  ? Not peeing often enough.  · You have other conditions, such as:  ? Diabetes.  ? A weak disease-fighting system (immune system).  ? Sickle cell disease.  ? Gout.  ? Injury of the spine.  What are the signs or symptoms?  Symptoms of this condition include:  · Needing to pee right away (urgently).  · Peeing often.  · Peeing small amounts often.  · Pain or burning when peeing.  · Blood in the pee.  · Pee that smells bad or not like normal.  · Trouble peeing.  · Pee that is cloudy.  · Fluid coming from the vagina, if you are female.  · Pain in the belly or lower back.  Other symptoms include:  · Throwing up (vomiting).  · No urge to eat.  · Feeling mixed up (confused).  · Being tired and grouchy (irritable).  · A fever.  · Watery poop (diarrhea).  How is this treated?  This condition may be treated with:  · Antibiotic medicine.  · Other medicines.  · Drinking enough water.  Follow these instructions at home:    Medicines  · Take over-the-counter and prescription medicines only as told by your doctor.  · If you were prescribed an antibiotic medicine, take it as told by your doctor. Do not stop taking it even if you start to feel better.  General instructions  · Make sure you:  ? Pee until your  bladder is empty.  ? Do not hold pee for a long time.  ? Empty your bladder after sex.  ? Wipe from front to back after pooping if you are a female. Use each tissue one time when you wipe.  · Drink enough fluid to keep your pee pale yellow.  · Keep all follow-up visits as told by your doctor. This is important.  Contact a doctor if:  · You do not get better after 1-2 days.  · Your symptoms go away and then come back.  Get help right away if:  · You have very bad back pain.  · You have very bad pain in your lower belly.  · You have a fever.  · You are sick to your stomach (nauseous).  · You are throwing up.  Summary  · A urinary tract infection (UTI) is an infection of any part of the urinary tract.  · This condition is caused by germs in your genital area.  · There are many risk factors for a UTI. These include having a small, thin tube to drain pee and not being able to control when you pee or poop.  · Treatment includes antibiotic medicines for germs.  · Drink enough fluid to keep your pee pale yellow.  This information is not intended to replace advice given to you by your health care provider. Make sure you discuss any questions you have with your health care provider.  Document Revised: 12/05/2019 Document Reviewed: 06/27/2019  Elsevier Patient Education © 2020 [employer] Inc.

## 2020-12-16 LAB
BACTERIA UR CULT: ABNORMAL
BACTERIA UR CULT: ABNORMAL
OTHER ANTIBIOTIC SUSC ISLT: ABNORMAL

## 2020-12-19 DIAGNOSIS — F32.89 OTHER DEPRESSION: ICD-10-CM

## 2020-12-21 DIAGNOSIS — I10 ESSENTIAL HYPERTENSION: ICD-10-CM

## 2020-12-21 RX ORDER — VENLAFAXINE 75 MG/1
TABLET ORAL
Qty: 120 TABLET | Refills: 1 | Status: SHIPPED | OUTPATIENT
Start: 2020-12-21 | End: 2021-02-19

## 2020-12-22 RX ORDER — INDAPAMIDE 2.5 MG/1
TABLET, FILM COATED ORAL
Qty: 90 TABLET | Refills: 0 | Status: SHIPPED | OUTPATIENT
Start: 2020-12-22 | End: 2021-01-25

## 2021-01-23 DIAGNOSIS — I10 ESSENTIAL HYPERTENSION: ICD-10-CM

## 2021-01-25 RX ORDER — INDAPAMIDE 2.5 MG/1
TABLET, FILM COATED ORAL
Qty: 90 TABLET | Refills: 0 | Status: SHIPPED | OUTPATIENT
Start: 2021-01-25 | End: 2021-07-23

## 2021-02-18 DIAGNOSIS — F32.89 OTHER DEPRESSION: ICD-10-CM

## 2021-02-19 RX ORDER — VENLAFAXINE 75 MG/1
TABLET ORAL
Qty: 120 TABLET | Refills: 1 | Status: SHIPPED | OUTPATIENT
Start: 2021-02-19 | End: 2021-05-25

## 2021-03-11 ENCOUNTER — CLINICAL SUPPORT (OUTPATIENT)
Dept: OBSTETRICS AND GYNECOLOGY | Facility: CLINIC | Age: 46
End: 2021-03-11

## 2021-03-11 VITALS — DIASTOLIC BLOOD PRESSURE: 70 MMHG | SYSTOLIC BLOOD PRESSURE: 128 MMHG

## 2021-03-11 DIAGNOSIS — Z30.42 ENCOUNTER FOR MANAGEMENT AND INJECTION OF DEPO-PROVERA: Primary | ICD-10-CM

## 2021-03-11 PROCEDURE — 96372 THER/PROPH/DIAG INJ SC/IM: CPT | Performed by: OBSTETRICS & GYNECOLOGY

## 2021-03-11 RX ORDER — MEDROXYPROGESTERONE ACETATE 150 MG/ML
150 INJECTION, SUSPENSION INTRAMUSCULAR ONCE
Status: COMPLETED | OUTPATIENT
Start: 2021-03-11 | End: 2021-03-11

## 2021-03-11 RX ADMIN — MEDROXYPROGESTERONE ACETATE 150 MG: 150 INJECTION, SUSPENSION INTRAMUSCULAR at 10:36

## 2021-03-11 NOTE — PROGRESS NOTES
Patient is here for her Depo-Provera 150 mg injection. Patient supplied her own.  She tolerated injection without reaction.    NDC: [address]-2  Lot: [id number]  Exp: 9/30/2023    Rt Deltoid IM

## 2021-05-02 NOTE — TELEPHONE ENCOUNTER
Please schedule for BP/meds   Pt states that his pulse ox was 84% RA but was back to 94% in 5-6 secs. On assessment pulse ox started at 92%, dropped to 86% for secs back to 94%.      @ [id number] updated Dr. Milli Mae on the above, ok to order NSS nasal spray

## 2021-05-10 RX ORDER — AMLODIPINE BESYLATE 5 MG/1
5 TABLET ORAL DAILY
Qty: 30 TABLET | Refills: 0 | Status: SHIPPED | OUTPATIENT
Start: 2021-05-10 | End: 2021-06-18

## 2021-05-10 RX ORDER — AMLODIPINE BESYLATE 5 MG/1
5 TABLET ORAL DAILY
Qty: 90 TABLET | OUTPATIENT
Start: 2021-05-10

## 2021-05-18 ENCOUNTER — TELEPHONE (OUTPATIENT)
Dept: FAMILY MEDICINE CLINIC | Facility: CLINIC | Age: 46
End: 2021-05-18

## 2021-05-18 NOTE — TELEPHONE ENCOUNTER
Caller: Rehana Cruz    Relationship to patient: Self    Best call back number: [phone]    Patient is needing: SCHEDULED MEDICARE WELLNESS VISIT WITH KARINE ALFORD  ON 05/24/2021 @ 11:15 AM

## 2021-05-19 DIAGNOSIS — Z79.899 HIGH RISK MEDICATION USE: ICD-10-CM

## 2021-05-19 DIAGNOSIS — I10 HYPERTENSION, ESSENTIAL: ICD-10-CM

## 2021-05-19 DIAGNOSIS — E83.110 HEREDITARY HEMOCHROMATOSIS (HCC): Primary | ICD-10-CM

## 2021-05-19 DIAGNOSIS — Z11.59 NEED FOR HEPATITIS C SCREENING TEST: ICD-10-CM

## 2021-05-19 LAB
ALBUMIN SERPL-MCNC: 4.4 G/DL (ref 3.5–5.2)
ALBUMIN/GLOB SERPL: 1.8 G/DL
ALP SERPL-CCNC: 107 U/L (ref 39–117)
ALT SERPL-CCNC: 13 U/L (ref 1–33)
AST SERPL-CCNC: 12 U/L (ref 1–32)
BILIRUB SERPL-MCNC: 0.4 MG/DL (ref 0–1.2)
BUN SERPL-MCNC: 15 MG/DL (ref 6–20)
BUN/CREAT SERPL: 30 (ref 7–25)
CALCIUM SERPL-MCNC: 10.1 MG/DL (ref 8.6–10.5)
CHLORIDE SERPL-SCNC: 98 MMOL/L (ref 98–107)
CHOLEST SERPL-MCNC: 277 MG/DL (ref 0–200)
CO2 SERPL-SCNC: 28.8 MMOL/L (ref 22–29)
CREAT SERPL-MCNC: 0.5 MG/DL (ref 0.57–1)
ERYTHROCYTE [DISTWIDTH] IN BLOOD BY AUTOMATED COUNT: 12.2 % (ref 12.3–15.4)
GLOBULIN SER CALC-MCNC: 2.4 GM/DL
GLUCOSE SERPL-MCNC: 99 MG/DL (ref 65–99)
HCT VFR BLD AUTO: 44.6 % (ref 34–46.6)
HDLC SERPL-MCNC: 53 MG/DL (ref 40–60)
HGB BLD-MCNC: 15.7 G/DL (ref 12–15.9)
IRON SATN MFR SERPL: 51 % (ref 20–50)
IRON SERPL-MCNC: 162 MCG/DL (ref 37–145)
LDLC SERPL CALC-MCNC: 199 MG/DL (ref 0–100)
LDLC/HDLC SERPL: 3.71 {RATIO}
MCH RBC QN AUTO: 32.2 PG (ref 26.6–33)
MCHC RBC AUTO-ENTMCNC: 35.2 G/DL (ref 31.5–35.7)
MCV RBC AUTO: 91.4 FL (ref 79–97)
PLATELET # BLD AUTO: 343 10*3/MM3 (ref 140–450)
POTASSIUM SERPL-SCNC: 3.1 MMOL/L (ref 3.5–5.2)
PROT SERPL-MCNC: 6.8 G/DL (ref 6–8.5)
RBC # BLD AUTO: 4.88 10*6/MM3 (ref 3.77–5.28)
SODIUM SERPL-SCNC: 138 MMOL/L (ref 136–145)
TIBC SERPL-MCNC: 319 MCG/DL
TRIGL SERPL-MCNC: 138 MG/DL (ref 0–150)
UIBC SERPL-MCNC: 157 MCG/DL (ref 112–346)
VLDLC SERPL CALC-MCNC: 25 MG/DL (ref 5–40)
WBC # BLD AUTO: 11.69 10*3/MM3 (ref 3.4–10.8)

## 2021-05-20 LAB
FERRITIN SERPL-MCNC: 175 NG/ML (ref 13–150)
HCV AB S/CO SERPL IA: 0.1 S/CO RATIO (ref 0–0.9)

## 2021-05-24 ENCOUNTER — OFFICE VISIT (OUTPATIENT)
Dept: FAMILY MEDICINE CLINIC | Facility: CLINIC | Age: 46
End: 2021-05-24

## 2021-05-24 VITALS
OXYGEN SATURATION: 98 % | HEIGHT: 60 IN | DIASTOLIC BLOOD PRESSURE: 80 MMHG | BODY MASS INDEX: 41.23 KG/M2 | SYSTOLIC BLOOD PRESSURE: 130 MMHG | WEIGHT: 210 LBS | TEMPERATURE: 98.1 F | RESPIRATION RATE: 14 BRPM | HEART RATE: 101 BPM

## 2021-05-24 DIAGNOSIS — F32.89 OTHER DEPRESSION: ICD-10-CM

## 2021-05-24 DIAGNOSIS — L21.9 SEBORRHEIC DERMATITIS: ICD-10-CM

## 2021-05-24 DIAGNOSIS — Z13.220 SCREENING FOR HYPERLIPIDEMIA: ICD-10-CM

## 2021-05-24 DIAGNOSIS — Z79.899 HIGH RISK MEDICATION USE: ICD-10-CM

## 2021-05-24 DIAGNOSIS — Z00.00 MEDICARE ANNUAL WELLNESS VISIT, SUBSEQUENT: Primary | ICD-10-CM

## 2021-05-24 PROCEDURE — G0439 PPPS, SUBSEQ VISIT: HCPCS | Performed by: NURSE PRACTITIONER

## 2021-05-24 PROCEDURE — 1159F MED LIST DOCD IN RCRD: CPT | Performed by: NURSE PRACTITIONER

## 2021-05-24 PROCEDURE — 96160 PT-FOCUSED HLTH RISK ASSMT: CPT | Performed by: NURSE PRACTITIONER

## 2021-05-24 NOTE — PROGRESS NOTES
Medicare Subsequent Wellness Visit  Subjective   History of Present Illness    Rehana Cruz is a 45 y.o. female who presents for an Medicare Wellness Visit. In addition, we addressed the following health issues:    Hyperlipidemia. Patient's recent labs showed elevated LDL. We discussed statin therapy and patient does not wish to start medication at this time, she would like to try lifestyle modification. She does report that she has been smoking more and is trying to quit.     Patient reports worsening seborrheic dermatitis and is requesting referral to dermatology.     PMH, PSH, SocHx, FamHx, Allergies, and Medications: Reviewed and updated in the history section of chart.  Family History   Problem Relation Age of Onset   • Ovarian cancer Mother         50's   • Multiple sclerosis Father    • Leukemia Paternal Uncle    • Alzheimer's disease Maternal Grandmother    • Leukemia Maternal Grandfather    • Hemochromatosis Brother        Social History     Social History Narrative    Lives alone in own condo with 2 cats.       Allergies   Allergen Reactions   • Latex Rash       Outpatient Medications Prior to Visit   Medication Sig Dispense Refill   • amLODIPine (NORVASC) 5 MG tablet TAKE 1 TABLET BY MOUTH DAILY 30 tablet 0   • fluticasone (FLONASE) 50 MCG/ACT nasal spray 1 spray into the nostril(s) as directed by provider Daily.     • indapamide (LOZOL) 2.5 MG tablet TAKE 1 TABLET BY MOUTH EVERY MORNING 90 tablet 0   • medroxyPROGESTERone (DEPO-PROVERA) 150 MG/ML injection INJECT 1 ML INTO THE MUSCLE AS DIRECTED EVERY 3 MONTHS 1 mL 0   • medroxyPROGESTERone (Depo-Provera) 150 MG/ML injection Inject 1 mL into the appropriate muscle as directed by prescriber Every 3 (Three) Months. 1 mL 3   • oxybutynin XL (DITROPAN XL) 15 MG 24 hr tablet Take 15 mg by mouth daily. extended release     • sulfamethoxazole-trimethoprim (BACTRIM,SEPTRA) 400-80 MG tablet TK 1 T PO HS     • triamcinolone (KENALOG) 0.1 % cream MORTZEA AA BID FOR  10 DAYS UTD  2   • venlafaxine (EFFEXOR) 75 MG tablet TAKE 2 TABLETS BY MOUTH TWICE DAILY 120 tablet 1     No facility-administered medications prior to visit.        Patient Active Problem List   Diagnosis   • Spina bifida (CMS/HCC)   • Depression   • Neurogenic bladder   • Hemochromatosis   • Hypertension   • Hereditary hemochromatosis (CMS/HCC)   • Morbidly obese (CMS/HCC)   • Current smoker   • Hypokalemia         Patient Care Team:  Rogelio De La Rosa MD as PCP - General  Health Habits:  Current Diet: Well balanced diet  Tobacco use.  Pack years:  Dental Exam.   Eye Exam.   Exercise:  Current exercise activities include:    Recent Hospitalizations:  none    Age-appropriate Screening Schedule:  Refer to the list below for future screening recommendations based on patient's age. Orders for these recommended tests are listed in the plan section. The patient has been provided with a written plan.      Health Maintenance   Topic Date Due   • INFLUENZA VACCINE  08/01/2021   • PAP SMEAR  08/29/2021   • LIPID PANEL  05/19/2022   • ANNUAL WELLNESS VISIT  05/24/2022   • TDAP/TD VACCINES (2 - Td or Tdap) 03/29/2026   • HEPATITIS C SCREENING  Completed   • COVID-19 Vaccine  Completed   • Pneumococcal Vaccine 0-64  Completed       Depression Screen:   PHQ-2/PHQ-9 Depression Screening 5/24/2021   Little interest or pleasure in doing things 0   Feeling down, depressed, or hopeless 0   Trouble falling or staying asleep, or sleeping too much 0   Poor appetite or overeating 0   Feeling bad about yourself - or that you are a failure or have let yourself or your family down 0   Trouble concentrating on things, such as reading the newspaper or watching television 0   Moving or speaking so slowly that other people could have noticed. Or the opposite - being so fidgety or restless that you have been moving around a lot more than usual 0   Thoughts that you would be better off dead, or of hurting yourself in some way 0   Total Score  0   If you checked off any problems, how difficult have these problems made it for you to do your work, take care of things at home, or get along with other people? Not difficult at all       Functional and Cognitive Screening:  Functional & Cognitive Status 5/24/2021   Do you have difficulty preparing food and eating? No   Do you have difficulty bathing yourself, getting dressed or grooming yourself? No   Do you have difficulty using the toilet? -   Do you have difficulty moving around from place to place? No   Do you have trouble with steps or getting out of a bed or a chair? No   Current Diet Well Balanced Diet   Dental Exam Not up to date   Eye Exam Up to date   Exercise (times per week) 0 times per week   Current Exercises Include No Regular Exercise   Current Exercise Activities Include -   Do you need help using the phone?  No   Are you deaf or do you have serious difficulty hearing?  No   Do you need help with transportation? No   Do you need help shopping? No   Do you need help preparing meals?  No   Do you need help with housework?  No   Do you need help with laundry? No   Do you need help taking your medications? No   Do you need help managing money? No   Do you ever drive or ride in a car without wearing a seat belt? No   Have you felt unusual stress, anger or loneliness in the last month? No   Who do you live with? Alone   If you need help, do you have trouble finding someone available to you? No   Have you been bothered in the last four weeks by sexual problems? No   Do you have difficulty concentrating, remembering or making decisions? No     Does the patient have evidence of cognitive impairment? No    Compared to one year ago, the patient feels their physical health and mental health are the same.       Review of Systems   Constitutional: Negative.  Negative for appetite change, chills, fatigue, fever and unexpected weight change.   HENT: Negative.  Negative for congestion, dental problem, postnasal  "drip, rhinorrhea and sore throat.         Dental exam is up to date.    Eyes: Negative.  Negative for photophobia and visual disturbance.        Eye exam is up to date. Wears glasses.    Respiratory: Negative.  Negative for cough, chest tightness, shortness of breath and wheezing.    Cardiovascular: Negative.  Negative for chest pain, palpitations and leg swelling.   Gastrointestinal: Negative.  Negative for abdominal pain, constipation, diarrhea, nausea and vomiting.   Endocrine: Positive for heat intolerance. Negative for cold intolerance, polydipsia, polyphagia and polyuria.   Genitourinary: Negative.  Negative for difficulty urinating, dysuria, frequency, pelvic pain, urgency, vaginal bleeding and vaginal discharge.   Musculoskeletal: Positive for arthralgias (Right hip pain). Negative for back pain, gait problem and myalgias.   Skin: Negative.         seborrhoic dermattitis     Allergic/Immunologic: Negative.  Negative for environmental allergies and food allergies.   Neurological: Negative.  Negative for dizziness, speech difficulty, weakness, numbness and headaches.   Hematological: Negative.  Does not bruise/bleed easily.   Psychiatric/Behavioral: Negative.  Negative for dysphoric mood and sleep disturbance. The patient is not nervous/anxious.        Objective     Vitals:    05/24/21 1107   BP: 130/80   Pulse: 101   Resp: 14   Temp: 98.1 °F (36.7 °C)   SpO2: 98%   Weight: 95.3 kg (210 lb)   Height: 152.4 cm (60\")       Body mass index is 41.01 kg/m².    PHYSICAL EXAM  Vitals reviewed and on chart.  HEENT: PERRLA, EOMI. Oral mucosa moist,   No LAD.  CV: RRR, no murmurs, rubs, clicks or gallops  LUNGS: CTA bilaterally  EXT: No edema, FROM in bilateral upper and lower ext  NEURO: CN II - XII grossly intact  PSYCH: good mood, positive affect, alert and engaged. No thoughts of self harm  expressed.    ASSESSMENT AND PLAN  Pap smear : Scheduled for June 2021. [facility]  Mammogram:  Scheduled for June 2021. "   Colon cancer screening :  Not indicated  Lung cancer screening :  N/A  Bone Density : N/A      Problem List Items Addressed This Visit     None      Visit Diagnoses     Medicare annual wellness visit, subsequent    -  Primary    Screening for hyperlipidemia        High risk medication use        Seborrheic dermatitis        Relevant Orders    Ambulatory Referral to Dermatology (Completed)          Orders:  Orders Placed This Encounter   Procedures   • Ambulatory Referral to Dermatology       Follow Up:  Return in about 3 months (around 2021) for Recheck, Labs.     ADVANCED DIRECTIVES:   ACP discussion was held with the patient during this visit. Patient does not have an advance directive, information provided.    An After Visit Summary and PPPS with all of these plans were given to the patient.      Reviewed labs with patient at visit.           Patient Instructions     Return in about 3 months (around 2021) for Recheck, Labs.    Medicare Wellness  Personal Prevention Plan of Service     Date of Office Visit:  2021  Encounter Provider:  LEFTY Blanco  Place of Service:  [facility]  Patient Name: Rehana Cruz  :  1975    As part of the Medicare Wellness portion of your visit today, we are providing you with this personalized preventive plan of services (PPPS). This plan is based upon recommendations of the [place] Preventive Services Task Force (USPSTF) and the Advisory Committee on Immunization Practices (ACIP).    This lists the preventive care services that should be considered, and provides dates of when you are due. Items listed as completed are up-to-date and do not require any further intervention.    Health Maintenance   Topic Date Due   • INFLUENZA VACCINE  2021   • PAP SMEAR  2021   • LIPID PANEL  2022   • ANNUAL WELLNESS VISIT  2022   • TDAP/TD VACCINES (2 - Td or Tdap) 2026   • HEPATITIS C SCREENING   Completed   • COVID-19 Vaccine  Completed   • Pneumococcal Vaccine 0-64  Completed       No orders of the defined types were placed in this encounter.      Return in about 3 months (around 8/24/2021) for Recheck, Labs.

## 2021-05-24 NOTE — PATIENT INSTRUCTIONS
Return in about 3 months (around 2021) for Recheck, Labs.    Medicare Wellness  Personal Prevention Plan of Service     Date of Office Visit:  2021  Encounter Provider:  LEFTY Blanco  Place of Service:  [facility]  Patient Name: Rehana Cruz  :  1975    As part of the Medicare Wellness portion of your visit today, we are providing you with this personalized preventive plan of services (PPPS). This plan is based upon recommendations of the [place] Preventive Services Task Force (USPSTF) and the Advisory Committee on Immunization Practices (ACIP).    This lists the preventive care services that should be considered, and provides dates of when you are due. Items listed as completed are up-to-date and do not require any further intervention.    Health Maintenance   Topic Date Due   • INFLUENZA VACCINE  2021   • PAP SMEAR  2021   • LIPID PANEL  2022   • ANNUAL WELLNESS VISIT  2022   • TDAP/TD VACCINES (2 - Td or Tdap) 2026   • HEPATITIS C SCREENING  Completed   • COVID-19 Vaccine  Completed   • Pneumococcal Vaccine 0-64  Completed       No orders of the defined types were placed in this encounter.      Return in about 3 months (around 2021) for Recheck, Labs.

## 2021-05-25 RX ORDER — VENLAFAXINE 75 MG/1
TABLET ORAL
Qty: 120 TABLET | Refills: 1 | Status: SHIPPED | OUTPATIENT
Start: 2021-05-25 | End: 2021-08-03

## 2021-06-09 ENCOUNTER — APPOINTMENT (OUTPATIENT)
Dept: WOMENS IMAGING | Facility: HOSPITAL | Age: 46
End: 2021-06-09

## 2021-06-09 ENCOUNTER — PROCEDURE VISIT (OUTPATIENT)
Dept: OBSTETRICS AND GYNECOLOGY | Facility: CLINIC | Age: 46
End: 2021-06-09

## 2021-06-09 ENCOUNTER — OFFICE VISIT (OUTPATIENT)
Dept: OBSTETRICS AND GYNECOLOGY | Facility: CLINIC | Age: 46
End: 2021-06-09

## 2021-06-09 VITALS — SYSTOLIC BLOOD PRESSURE: 132 MMHG | DIASTOLIC BLOOD PRESSURE: 91 MMHG | HEART RATE: 163 BPM

## 2021-06-09 DIAGNOSIS — Z01.419 ENCOUNTER FOR GYNECOLOGICAL EXAMINATION: Primary | ICD-10-CM

## 2021-06-09 DIAGNOSIS — Z12.31 VISIT FOR SCREENING MAMMOGRAM: Primary | ICD-10-CM

## 2021-06-09 PROCEDURE — 99396 PREV VISIT EST AGE 40-64: CPT | Performed by: OBSTETRICS & GYNECOLOGY

## 2021-06-09 PROCEDURE — 77067 SCR MAMMO BI INCL CAD: CPT | Performed by: RADIOLOGY

## 2021-06-09 PROCEDURE — 77063 BREAST TOMOSYNTHESIS BI: CPT | Performed by: OBSTETRICS & GYNECOLOGY

## 2021-06-09 PROCEDURE — 77063 BREAST TOMOSYNTHESIS BI: CPT | Performed by: RADIOLOGY

## 2021-06-09 PROCEDURE — 77067 SCR MAMMO BI INCL CAD: CPT | Performed by: OBSTETRICS & GYNECOLOGY

## 2021-06-09 NOTE — PROGRESS NOTES
Chief Complaint  Gynecologic Exam- Pap- 2018 Mammo- 2021    Subjective          Rehana Cruz presents to [facility]  History of Present Illness  Patient is a 45-year-old female who presents for annual gynecological exam.  She does report occasional tenderness around both nipples, and states it resolves on its own.  Patient is on Depo-Provera and is amenorrheic.  She does have a family history of ovarian cancer.    Objective   Vital Signs:   /91   Pulse (!) 163     Physical Exam  Vitals reviewed. Exam conducted with a chaperone present.   Constitutional:       Appearance: She is well-developed.   Cardiovascular:      Rate and Rhythm: Normal rate and regular rhythm.   Pulmonary:      Effort: Pulmonary effort is normal.      Breath sounds: Normal breath sounds.   Chest:      Breasts:         Right: No inverted nipple, mass, nipple discharge, skin change or tenderness.         Left: No inverted nipple, mass, nipple discharge, skin change or tenderness.   Abdominal:      General: There is no distension.      Palpations: Abdomen is soft.      Tenderness: There is no abdominal tenderness.   Genitourinary:     Labia:         Right: No rash, tenderness, lesion or injury.         Left: No rash, tenderness, lesion or injury.       Vagina: Normal.      Cervix: Normal.      Uterus: Normal.       Adnexa:         Right: No mass, tenderness or fullness.          Left: No mass, tenderness or fullness.     Neurological:      Mental Status: She is alert.                 Assessment and Plan    Diagnoses and all orders for this visit:    1. Encounter for gynecological examination (Primary)  -     IGP, Apt HPV,rfx 16 / 18,45    Patient was counseled on monthly self breast exams and yearly screening mammograms for breast health.  She was counseled that most breast pain and tenderness is hormone related and can also be associated with increase in caffeine.  She was counseled on screening for colon cancer  at the age of 46.  She will follow-up in 1 year for annual exam or sooner if needed.      Follow Up   Return in about 1 year (around 6/9/2022) for Annual physical.  Patient was given instructions and counseling regarding her condition or for health maintenance advice. Please see specific information pulled into the AVS if appropriate.

## 2021-06-11 ENCOUNTER — CLINICAL SUPPORT (OUTPATIENT)
Dept: OBSTETRICS AND GYNECOLOGY | Facility: CLINIC | Age: 46
End: 2021-06-11

## 2021-06-11 ENCOUNTER — TELEPHONE (OUTPATIENT)
Dept: OBSTETRICS AND GYNECOLOGY | Facility: CLINIC | Age: 46
End: 2021-06-11

## 2021-06-11 VITALS — SYSTOLIC BLOOD PRESSURE: 142 MMHG | DIASTOLIC BLOOD PRESSURE: 92 MMHG

## 2021-06-11 DIAGNOSIS — Z30.42 ENCOUNTER FOR MANAGEMENT AND INJECTION OF DEPO-PROVERA: Primary | ICD-10-CM

## 2021-06-11 LAB
B-HCG UR QL: NEGATIVE
CYTOLOGIST CVX/VAG CYTO: NORMAL
CYTOLOGY CVX/VAG DOC CYTO: NORMAL
CYTOLOGY CVX/VAG DOC THIN PREP: NORMAL
DX ICD CODE: NORMAL
HIV 1 & 2 AB SER-IMP: NORMAL
HPV I/H RISK 4 DNA CVX QL PROBE+SIG AMP: NEGATIVE
INTERNAL NEGATIVE CONTROL: NORMAL
INTERNAL POSITIVE CONTROL: NORMAL
Lab: NORMAL
OTHER STN SPEC: NORMAL
STAT OF ADQ CVX/VAG CYTO-IMP: NORMAL

## 2021-06-11 PROCEDURE — 96372 THER/PROPH/DIAG INJ SC/IM: CPT | Performed by: OBSTETRICS & GYNECOLOGY

## 2021-06-11 PROCEDURE — 81025 URINE PREGNANCY TEST: CPT | Performed by: OBSTETRICS & GYNECOLOGY

## 2021-06-11 RX ORDER — MEDROXYPROGESTERONE ACETATE 150 MG/ML
150 INJECTION, SUSPENSION INTRAMUSCULAR ONCE
Status: COMPLETED | OUTPATIENT
Start: 2021-06-11 | End: 2021-06-11

## 2021-06-11 RX ADMIN — MEDROXYPROGESTERONE ACETATE 150 MG: 150 INJECTION, SUSPENSION INTRAMUSCULAR at 10:01

## 2021-06-11 NOTE — PROGRESS NOTES
Pt here for pt supplied depo injection, tolerated injection without reaction. Lt Deltoid.  NDC:[address]-2  LOT:[id number]  EXP:12/31/2024

## 2021-06-11 NOTE — PATIENT INSTRUCTIONS
Pt here for pt supplied depo injection, tolerated injection without reaction. Lt Deltoid.  NDC:[address]-6  LOT:[id number]  EXP:12/31/2024

## 2021-06-11 NOTE — TELEPHONE ENCOUNTER
Dr. Salazar this pt came in for Depo injection her BP was 142/92 today. I did have to use the blood pressure cuff twice cause first would not read. Pt stated usually not that high. She said sometimes when she moves from chair to restroom and back can be tiring. Was not sure if this was ok or if you would like her to follow up with PCP or not. Please advise.

## 2021-06-18 DIAGNOSIS — I10 HYPERTENSION, ESSENTIAL: Primary | ICD-10-CM

## 2021-06-18 RX ORDER — AMLODIPINE BESYLATE 5 MG/1
5 TABLET ORAL DAILY
Qty: 30 TABLET | Refills: 2 | Status: SHIPPED | OUTPATIENT
Start: 2021-06-18 | End: 2021-09-02

## 2021-07-08 ENCOUNTER — OFFICE VISIT (OUTPATIENT)
Dept: FAMILY MEDICINE CLINIC | Facility: CLINIC | Age: 46
End: 2021-07-08

## 2021-07-08 VITALS
DIASTOLIC BLOOD PRESSURE: 90 MMHG | HEIGHT: 60 IN | HEART RATE: 103 BPM | WEIGHT: 210 LBS | SYSTOLIC BLOOD PRESSURE: 130 MMHG | BODY MASS INDEX: 41.23 KG/M2 | RESPIRATION RATE: 16 BRPM | OXYGEN SATURATION: 98 %

## 2021-07-08 DIAGNOSIS — R41.840 POOR CONCENTRATION: Primary | ICD-10-CM

## 2021-07-08 PROCEDURE — 99213 OFFICE O/P EST LOW 20 MIN: CPT | Performed by: NURSE PRACTITIONER

## 2021-07-08 NOTE — PATIENT INSTRUCTIONS
Return if symptoms worsen or fail to improve.  Call with any questions or concerns.   Debrox gtts over the counter as directed.       Managing Anxiety, Adult  After being diagnosed with an anxiety disorder, you may be relieved to know why you have felt or behaved a certain way. You may also feel overwhelmed about the treatment ahead and what it will mean for your life. With care and support, you can manage this condition and recover from it.  How to manage lifestyle changes  Managing stress and anxiety    Stress is your body's reaction to life changes and events, both good and bad. Most stress will last just a few hours, but stress can be ongoing and can lead to more than just stress. Although stress can play a major role in anxiety, it is not the same as anxiety. Stress is usually caused by something external, such as a deadline, test, or competition. Stress normally passes after the triggering event has ended.   Anxiety is caused by something internal, such as imagining a terrible outcome or worrying that something will go wrong that will devastate you. Anxiety often does not go away even after the triggering event is over, and it can become long-term (chronic) worry. It is important to understand the differences between stress and anxiety and to manage your stress effectively so that it does not lead to an anxious response.  Talk with your health care provider or a counselor to learn more about reducing anxiety and stress. He or she may suggest tension reduction techniques, such as:  · Music therapy. This can include creating or listening to music that you enjoy and that inspires you.  · Mindfulness-based meditation. This involves being aware of your normal breaths while not trying to control your breathing. It can be done while sitting or walking.  · Centering prayer. This involves focusing on a word, phrase, or sacred image that means something to you and brings you peace.  · Deep breathing. To do this, expand  your stomach and inhale slowly through your nose. Hold your breath for 3-5 seconds. Then exhale slowly, letting your stomach muscles relax.  · Self-talk. This involves identifying thought patterns that lead to anxiety reactions and changing those patterns.  · Muscle relaxation. This involves tensing muscles and then relaxing them.  Choose a tension reduction technique that suits your lifestyle and personality. These techniques take time and practice. Set aside 5-15 minutes a day to do them. Therapists can offer counseling and training in these techniques. The training to help with anxiety may be covered by some insurance plans. Other things you can do to manage stress and anxiety include:  · Keeping a stress/anxiety diary. This can help you learn what triggers your reaction and then learn ways to manage your response.  · Thinking about how you react to certain situations. You may not be able to control everything, but you can control your response.  · Making time for activities that help you relax and not feeling guilty about spending your time in this way.  · Visual imagery and yoga can help you stay calm and relax.    Medicines  Medicines can help ease symptoms. Medicines for anxiety include:  · Anti-anxiety drugs.  · Antidepressants.  Medicines are often used as a primary treatment for anxiety disorder. Medicines will be prescribed by a health care provider. When used together, medicines, psychotherapy, and tension reduction techniques may be the most effective treatment.  Relationships  Relationships can play a big part in helping you recover. Try to spend more time connecting with trusted friends and family members. Consider going to couples counseling, taking family education classes, or going to family therapy. Therapy can help you and others better understand your condition.  How to recognize changes in your anxiety  Everyone responds differently to treatment for anxiety. Recovery from anxiety happens when  symptoms decrease and stop interfering with your daily activities at home or work. This may mean that you will start to:  · Have better concentration and focus. Worry will interfere less in your daily thinking.  · Sleep better.  · Be less irritable.  · Have more energy.  · Have improved memory.  It is important to recognize when your condition is getting worse. Contact your health care provider if your symptoms interfere with home or work and you feel like your condition is not improving.  Follow these instructions at home:  Activity  · Exercise. Most adults should do the following:  ? Exercise for at least 150 minutes each week. The exercise should increase your heart rate and make you sweat (moderate-intensity exercise).  ? Strengthening exercises at least twice a week.  · Get the right amount and quality of sleep. Most adults need 7-9 hours of sleep each night.  Lifestyle    · Eat a healthy diet that includes plenty of vegetables, fruits, whole grains, low-fat dairy products, and lean protein. Do not eat a lot of foods that are high in solid fats, added sugars, or salt.  · Make choices that simplify your life.  · Do not use any products that contain nicotine or tobacco, such as cigarettes, e-cigarettes, and chewing tobacco. If you need help quitting, ask your health care provider.  · Avoid caffeine, alcohol, and certain over-the-counter cold medicines. These may make you feel worse. Ask your pharmacist which medicines to avoid.  General instructions  · Take over-the-counter and prescription medicines only as told by your health care provider.  · Keep all follow-up visits as told by your health care provider. This is important.  Where to find support  You can get help and support from these sources:  · Self-help groups.  · Online and community organizations.  · A trusted spiritual leader.  · Couples counseling.  · Family education classes.  · Family therapy.  Where to find more information  You may find that  joining a support group helps you deal with your anxiety. The following sources can help you locate counselors or support groups near you:  · Mental Health [place]: www.mentalhealthamer[place].net  · Anxiety and Depression Association of [place] (ADAA): www.adaa.org  · National [place] on Mental Illness (DONNA): www.donna.org  Contact a health care provider if you:  · Have a hard time staying focused or finishing daily tasks.  · Spend many hours a day feeling worried about everyday life.  · Become exhausted by worry.  · Start to have headaches, feel tense, or have nausea.  · Urinate more than normal.  · Have diarrhea.  Get help right away if you have:  · A racing heart and shortness of breath.  · Thoughts of hurting yourself or others.  If you ever feel like you may hurt yourself or others, or have thoughts about taking your own life, get help right away. You can go to your nearest emergency department or call:  · Your local emergency services (911 in the [place]  · A suicide crisis helpline, such as the National Suicide Prevention Lifeline at 1-632.300.8067. This is open 24 hours a day.  Summary  · Taking steps to learn and use tension reduction techniques can help calm you and help prevent triggering an anxiety reaction.  · When used together, medicines, psychotherapy, and tension reduction techniques may be the most effective treatment.  · Family, friends, and partners can play a big part in helping you recover from an anxiety disorder.  This information is not intended to replace advice given to you by your health care provider. Make sure you discuss any questions you have with your health care provider.  Document Revised: 05/19/2020 Document Reviewed: 05/19/2020  [employer] Patient Education © 2021 [employer]      Major Depressive Disorder, Adult  Major depressive disorder is a mental health condition. This disorder affects feelings. It can also affect the body. Symptoms of this condition last most of the day,  almost every day, for 2 weeks. This disorder can affect:  · Relationships.  · Daily activities, such as work and school.  · Activities that you normally like to do.  What are the causes?  The cause of this condition is not known. The disorder is likely caused by a mix of things, including:  · Your personality, such as being a shy person.  · Your behavior, or how you act toward others.  · Your thoughts and feelings.  · Too much alcohol or drugs.  · How you react to stress.  · Health and mental problems that you have had for a long time.  · Things that hurt you in the past (trauma).  · Big changes in your life, such as divorce.  What increases the risk?  The following factors may make you more likely to develop this condition:  · Having family members with depression.  · Being a woman.  · Problems in the family.  · Low levels of some brain chemicals.  · Things that caused you pain as a child, especially if you lost a parent or were abused.  · A lot of stress in your life, such as from:  ? Living without basic needs of life, such as food and shelter.  ? Being treated poorly because of race, sex, or Evangelical (discrimination).  · Health and mental problems that you have had for a long time.  What are the signs or symptoms?  The main symptoms of this condition are:  · Being sad all the time.  · Being grouchy all the time.  · Loss of interest in things and activities.  Other symptoms include:  · Sleeping too much or too little.  · Eating too much or too little.  · Gaining or losing weight, without knowing why.  · Feeling tired or having low energy.  · Being restless and weak.  · Feeling hopeless, worthless, or guilty.  · Trouble thinking clearly or making decisions.  · Thoughts of hurting yourself or others, or thoughts of ending your life.  · Spending a lot of time alone.  · Inability to complete common tasks of daily life.  If you have very bad MDD, you may:  · Believe things that are not true.  · Hear, see, taste, or  feel things that are not there.  · Have mild depression that lasts for at least 2 years.  · Feel very sad and hopeless.  · Have trouble speaking or moving.  How is this treated?  This condition may be treated with:  · Talk therapy. This teaches you to know bad thoughts, feelings, and actions and how to change them.  ? This can also help you to communicate with others.  ? This can be done with members of your family.  · Medicines. These can be used to treat worry (anxiety), depression, or low levels of chemicals in the brain.  · Lifestyle changes. You may need to:  ? Limit alcohol use.  ? Limit drug use.  ? Get regular exercise.  ? Get plenty of sleep.  ? Make healthy eating choices.  ? Spend more time outdoors.  · Brain stimulation. This treatment excites the brain. This is done when symptoms are very bad or have not gotten better with other treatments.  Follow these instructions at home:  Activity  · Get regular exercise as told.  · Spend time outdoors as told.  · Make time to do the things you enjoy.  · Find ways to deal with stress. Try to:  ? Meditate.  ? Do deep breathing.  ? Spend time in nature.  ? Keep a journal.  · Return to your normal activities as told by your doctor. Ask your doctor what activities are safe for you.  Alcohol and drug use  · If you drink alcohol:  ? Limit how much you use to:  § 0-1 drink a day for women.  § 0-2 drinks a day for men.  ? Be aware of how much alcohol is in your drink. In the [place] one drink equals one 12 oz bottle of beer (355 mL), one 5 oz glass of wine (148 mL), or one 1½ oz glass of hard liquor (44 mL).  · Talk to your doctor about:  ? Alcohol use. Alcohol can affect some medicines.  ? Any drug use.  General instructions    · Take over-the-counter and prescription medicines and herbal preparations only as told by your doctor.  · Eat a healthy diet.  · Get a lot of sleep.  · Think about joining a support group. Your doctor may be able to suggest one.  · Keep all  follow-up visits as told by your doctor. This is important.  Where to find more information:  · National [place] on Mental Illness: www.alyssa.org  · [place] of Mental Health: www.nimh.nih.gov  · American Psychiatric Association: www.psychiatry.org/patients-families/  Contact a doctor if:  · Your symptoms get worse.  · You get new symptoms.  Get help right away if:  · You hurt yourself.  · You have serious thoughts about hurting yourself or others.  · You see, hear, taste, smell, or feel things that are not there.  If you ever feel like you may hurt yourself or others, or have thoughts about taking your own life, get help right away. Go to your nearest emergency department or:  · Call your local emergency services (1 in the [place]  · Call a suicide crisis helpline, such as the National Suicide Prevention Lifeline at 1-100.614.8835. This is open 24 hours a day in the [place]  · Text the Crisis Text Line at 004279 (in the [place]  Summary  · Major depressive disorder is a mental health condition. This disorder affects feelings. Symptoms of this condition last most of the day, almost every day, for 2 weeks.  · The symptoms of this disorder can cause problems with relationships and with daily activities.  · There are treatments and support for people who get this disorder. You may need more than one type of treatment.  · Get help right away if you have serious thoughts about hurting yourself or others.  This information is not intended to replace advice given to you by your health care provider. Make sure you discuss any questions you have with your health care provider.  Document Revised: 11/28/2020 Document Reviewed: 11/28/2020  [employer] Patient Education © 2021 [employer]

## 2021-07-08 NOTE — PROGRESS NOTES
"Subjective   Rehana Cruz is a 45 y.o. female.     History of Present Illness   Patient presents with decreased concentration and forgetfulness. She reports that she's struggled with this her whole life, but has recently gotten worse after starting a new job. She reports that she has issues with feeling \"bad about herself\" when she is unable to get things done. Patient is requesting to be sent for ADD testing. Patient does admit to anxiety and depression. She is taking effexor for this.     Patient is also c/o left ear cerumen impaction. She states that she has been using debrox gtts over the counter without relief. Patient denies any pain in the ear.     The following portions of the patient's history were reviewed and updated as appropriate: allergies, current medications, past family history, past medical history, past social history, past surgical history and problem list.    Review of Systems   Constitutional: Negative for chills, fatigue and fever.   Respiratory: Negative for cough and shortness of breath.    Cardiovascular: Negative for chest pain, palpitations and leg swelling.   Neurological: Negative for dizziness and headache.   Hematological: Negative.    Psychiatric/Behavioral: Positive for decreased concentration and depressed mood. Negative for sleep disturbance. The patient is nervous/anxious.        Objective   Physical Exam  Vitals and nursing note reviewed.   Constitutional:       Appearance: Normal appearance. She is well-developed. She is obese.   HENT:      Head: Normocephalic and atraumatic.      Left Ear: External ear normal. No swelling or tenderness. A middle ear effusion is present.      Ears:      Comments: Ear canal erythematous. Some cerumen in canal, but was not impacted.   Eyes:      Conjunctiva/sclera: Conjunctivae normal.      Pupils: Pupils are equal, round, and reactive to light.   Cardiovascular:      Rate and Rhythm: Normal rate and regular rhythm.      Heart sounds: Normal " heart sounds. No murmur heard.     Pulmonary:      Effort: Pulmonary effort is normal.      Breath sounds: Normal breath sounds.   Neurological:      Mental Status: She is alert and oriented to person, place, and time.   Psychiatric:         Attention and Perception: Attention and perception normal.         Mood and Affect: Mood is anxious and depressed. Affect is tearful.         Speech: Speech normal.         Behavior: Behavior normal. Behavior is cooperative.         Thought Content: Thought content normal.         Cognition and Memory: Cognition normal.         Judgment: Judgment normal.         Vitals:    07/08/21 1011   BP: 130/90   Pulse: 103   Resp: 16   SpO2: 98%     Body mass index is 41.23 kg/m².    Procedures    Assessment/Plan   Problems Addressed this Visit     None      Visit Diagnoses     Poor concentration    -  Primary    Relevant Orders    Ambulatory Referral to Neuropsychology      Diagnoses       Codes Comments    Poor concentration    -  Primary ICD-10-CM: R41.840  ICD-9-CM: 799.51         Referral to neuropsychology.  Debrox gtts over the counter as directed.       Return if symptoms worsen or fail to improve.       Patient Instructions   Return if symptoms worsen or fail to improve.  Call with any questions or concerns.   Debrox gtts over the counter as directed.       Managing Anxiety, Adult  After being diagnosed with an anxiety disorder, you may be relieved to know why you have felt or behaved a certain way. You may also feel overwhelmed about the treatment ahead and what it will mean for your life. With care and support, you can manage this condition and recover from it.  How to manage lifestyle changes  Managing stress and anxiety    Stress is your body's reaction to life changes and events, both good and bad. Most stress will last just a few hours, but stress can be ongoing and can lead to more than just stress. Although stress can play a major role in anxiety, it is not the same as  anxiety. Stress is usually caused by something external, such as a deadline, test, or competition. Stress normally passes after the triggering event has ended.   Anxiety is caused by something internal, such as imagining a terrible outcome or worrying that something will go wrong that will devastate you. Anxiety often does not go away even after the triggering event is over, and it can become long-term (chronic) worry. It is important to understand the differences between stress and anxiety and to manage your stress effectively so that it does not lead to an anxious response.  Talk with your health care provider or a counselor to learn more about reducing anxiety and stress. He or she may suggest tension reduction techniques, such as:  · Music therapy. This can include creating or listening to music that you enjoy and that inspires you.  · Mindfulness-based meditation. This involves being aware of your normal breaths while not trying to control your breathing. It can be done while sitting or walking.  · Centering prayer. This involves focusing on a word, phrase, or sacred image that means something to you and brings you peace.  · Deep breathing. To do this, expand your stomach and inhale slowly through your nose. Hold your breath for 3-5 seconds. Then exhale slowly, letting your stomach muscles relax.  · Self-talk. This involves identifying thought patterns that lead to anxiety reactions and changing those patterns.  · Muscle relaxation. This involves tensing muscles and then relaxing them.  Choose a tension reduction technique that suits your lifestyle and personality. These techniques take time and practice. Set aside 5-15 minutes a day to do them. Therapists can offer counseling and training in these techniques. The training to help with anxiety may be covered by some insurance plans. Other things you can do to manage stress and anxiety include:  · Keeping a stress/anxiety diary. This can help you learn what  triggers your reaction and then learn ways to manage your response.  · Thinking about how you react to certain situations. You may not be able to control everything, but you can control your response.  · Making time for activities that help you relax and not feeling guilty about spending your time in this way.  · Visual imagery and yoga can help you stay calm and relax.    Medicines  Medicines can help ease symptoms. Medicines for anxiety include:  · Anti-anxiety drugs.  · Antidepressants.  Medicines are often used as a primary treatment for anxiety disorder. Medicines will be prescribed by a health care provider. When used together, medicines, psychotherapy, and tension reduction techniques may be the most effective treatment.  Relationships  Relationships can play a big part in helping you recover. Try to spend more time connecting with trusted friends and family members. Consider going to couples counseling, taking family education classes, or going to family therapy. Therapy can help you and others better understand your condition.  How to recognize changes in your anxiety  Everyone responds differently to treatment for anxiety. Recovery from anxiety happens when symptoms decrease and stop interfering with your daily activities at home or work. This may mean that you will start to:  · Have better concentration and focus. Worry will interfere less in your daily thinking.  · Sleep better.  · Be less irritable.  · Have more energy.  · Have improved memory.  It is important to recognize when your condition is getting worse. Contact your health care provider if your symptoms interfere with home or work and you feel like your condition is not improving.  Follow these instructions at home:  Activity  · Exercise. Most adults should do the following:  ? Exercise for at least 150 minutes each week. The exercise should increase your heart rate and make you sweat (moderate-intensity exercise).  ? Strengthening exercises at  least twice a week.  · Get the right amount and quality of sleep. Most adults need 7-9 hours of sleep each night.  Lifestyle    · Eat a healthy diet that includes plenty of vegetables, fruits, whole grains, low-fat dairy products, and lean protein. Do not eat a lot of foods that are high in solid fats, added sugars, or salt.  · Make choices that simplify your life.  · Do not use any products that contain nicotine or tobacco, such as cigarettes, e-cigarettes, and chewing tobacco. If you need help quitting, ask your health care provider.  · Avoid caffeine, alcohol, and certain over-the-counter cold medicines. These may make you feel worse. Ask your pharmacist which medicines to avoid.  General instructions  · Take over-the-counter and prescription medicines only as told by your health care provider.  · Keep all follow-up visits as told by your health care provider. This is important.  Where to find support  You can get help and support from these sources:  · Self-help groups.  · Online and community organizations.  · A trusted spiritual leader.  · Couples counseling.  · Family education classes.  · Family therapy.  Where to find more information  You may find that joining a support group helps you deal with your anxiety. The following sources can help you locate counselors or support groups near you:  · Mental Health [place]: www.mentalhealthamerica.net  · Anxiety and Depression Association of [place] (ADAA): www.adaa.org  · National [place] on Mental Illness (DONNA): www.donna.org  Contact a health care provider if you:  · Have a hard time staying focused or finishing daily tasks.  · Spend many hours a day feeling worried about everyday life.  · Become exhausted by worry.  · Start to have headaches, feel tense, or have nausea.  · Urinate more than normal.  · Have diarrhea.  Get help right away if you have:  · A racing heart and shortness of breath.  · Thoughts of hurting yourself or others.  If you ever feel like you  may hurt yourself or others, or have thoughts about taking your own life, get help right away. You can go to your nearest emergency department or call:  · Your local emergency services (911 in the [place]  · A suicide crisis helpline, such as the National Suicide Prevention Lifeline at 1-282.621.8701. This is open 24 hours a day.  Summary  · Taking steps to learn and use tension reduction techniques can help calm you and help prevent triggering an anxiety reaction.  · When used together, medicines, psychotherapy, and tension reduction techniques may be the most effective treatment.  · Family, friends, and partners can play a big part in helping you recover from an anxiety disorder.  This information is not intended to replace advice given to you by your health care provider. Make sure you discuss any questions you have with your health care provider.  Document Revised: 05/19/2020 Document Reviewed: 05/19/2020  [employer] Patient Education © 2021 [employer]      Major Depressive Disorder, Adult  Major depressive disorder is a mental health condition. This disorder affects feelings. It can also affect the body. Symptoms of this condition last most of the day, almost every day, for 2 weeks. This disorder can affect:  · Relationships.  · Daily activities, such as work and school.  · Activities that you normally like to do.  What are the causes?  The cause of this condition is not known. The disorder is likely caused by a mix of things, including:  · Your personality, such as being a shy person.  · Your behavior, or how you act toward others.  · Your thoughts and feelings.  · Too much alcohol or drugs.  · How you react to stress.  · Health and mental problems that you have had for a long time.  · Things that hurt you in the past (trauma).  · Big changes in your life, such as divorce.  What increases the risk?  The following factors may make you more likely to develop this condition:  · Having family members with  depression.  · Being a woman.  · Problems in the family.  · Low levels of some brain chemicals.  · Things that caused you pain as a child, especially if you lost a parent or were abused.  · A lot of stress in your life, such as from:  ? Living without basic needs of life, such as food and shelter.  ? Being treated poorly because of race, sex, or Worship (discrimination).  · Health and mental problems that you have had for a long time.  What are the signs or symptoms?  The main symptoms of this condition are:  · Being sad all the time.  · Being grouchy all the time.  · Loss of interest in things and activities.  Other symptoms include:  · Sleeping too much or too little.  · Eating too much or too little.  · Gaining or losing weight, without knowing why.  · Feeling tired or having low energy.  · Being restless and weak.  · Feeling hopeless, worthless, or guilty.  · Trouble thinking clearly or making decisions.  · Thoughts of hurting yourself or others, or thoughts of ending your life.  · Spending a lot of time alone.  · Inability to complete common tasks of daily life.  If you have very bad MDD, you may:  · Believe things that are not true.  · Hear, see, taste, or feel things that are not there.  · Have mild depression that lasts for at least 2 years.  · Feel very sad and hopeless.  · Have trouble speaking or moving.  How is this treated?  This condition may be treated with:  · Talk therapy. This teaches you to know bad thoughts, feelings, and actions and how to change them.  ? This can also help you to communicate with others.  ? This can be done with members of your family.  · Medicines. These can be used to treat worry (anxiety), depression, or low levels of chemicals in the brain.  · Lifestyle changes. You may need to:  ? Limit alcohol use.  ? Limit drug use.  ? Get regular exercise.  ? Get plenty of sleep.  ? Make healthy eating choices.  ? Spend more time outdoors.  · Brain stimulation. This treatment excites  the brain. This is done when symptoms are very bad or have not gotten better with other treatments.  Follow these instructions at home:  Activity  · Get regular exercise as told.  · Spend time outdoors as told.  · Make time to do the things you enjoy.  · Find ways to deal with stress. Try to:  ? Meditate.  ? Do deep breathing.  ? Spend time in nature.  ? Keep a journal.  · Return to your normal activities as told by your doctor. Ask your doctor what activities are safe for you.  Alcohol and drug use  · If you drink alcohol:  ? Limit how much you use to:  § 0-1 drink a day for women.  § 0-2 drinks a day for men.  ? Be aware of how much alcohol is in your drink. In the [place] one drink equals one 12 oz bottle of beer (355 mL), one 5 oz glass of wine (148 mL), or one 1½ oz glass of hard liquor (44 mL).  · Talk to your doctor about:  ? Alcohol use. Alcohol can affect some medicines.  ? Any drug use.  General instructions    · Take over-the-counter and prescription medicines and herbal preparations only as told by your doctor.  · Eat a healthy diet.  · Get a lot of sleep.  · Think about joining a support group. Your doctor may be able to suggest one.  · Keep all follow-up visits as told by your doctor. This is important.  Where to find more information:  · National [place] on Mental Illness: www.alyssa.org  · [place] of [place]: www.nimh.nih.gov  · American Psychiatric Association: www.psychiatry.org/patients-families/  Contact a doctor if:  · Your symptoms get worse.  · You get new symptoms.  Get help right away if:  · You hurt yourself.  · You have serious thoughts about hurting yourself or others.  · You see, hear, taste, smell, or feel things that are not there.  If you ever feel like you may hurt yourself or others, or have thoughts about taking your own life, get help right away. Go to your nearest emergency department or:  · Call your local emergency services (911 in the [place]  · Call a  suicide crisis helpline, such as the National Suicide Prevention Lifeline at 1-670.188.7655. This is open 24 hours a day in the [place]  · Text the Crisis Text Line at 246365 (in the [place]  Summary  · Major depressive disorder is a mental health condition. This disorder affects feelings. Symptoms of this condition last most of the day, almost every day, for 2 weeks.  · The symptoms of this disorder can cause problems with relationships and with daily activities.  · There are treatments and support for people who get this disorder. You may need more than one type of treatment.  · Get help right away if you have serious thoughts about hurting yourself or others.  This information is not intended to replace advice given to you by your health care provider. Make sure you discuss any questions you have with your health care provider.  Document Revised: 11/28/2020 Document Reviewed: 11/28/2020  [employer] Patient Education © 2021 [employer]

## 2021-07-23 DIAGNOSIS — I10 ESSENTIAL HYPERTENSION: ICD-10-CM

## 2021-07-23 RX ORDER — INDAPAMIDE 2.5 MG/1
TABLET, FILM COATED ORAL
Qty: 90 TABLET | Refills: 0 | Status: SHIPPED | OUTPATIENT
Start: 2021-07-23 | End: 2021-10-28

## 2021-08-03 DIAGNOSIS — F32.89 OTHER DEPRESSION: ICD-10-CM

## 2021-08-03 RX ORDER — VENLAFAXINE 75 MG/1
TABLET ORAL
Qty: 120 TABLET | Refills: 1 | Status: SHIPPED | OUTPATIENT
Start: 2021-08-03 | End: 2021-10-05

## 2021-09-02 DIAGNOSIS — I10 HYPERTENSION, ESSENTIAL: ICD-10-CM

## 2021-09-02 RX ORDER — AMLODIPINE BESYLATE 5 MG/1
5 TABLET ORAL DAILY
Qty: 30 TABLET | Refills: 2 | Status: SHIPPED | OUTPATIENT
Start: 2021-09-02 | End: 2021-12-22

## 2021-09-15 RX ORDER — MEDROXYPROGESTERONE ACETATE 150 MG/ML
INJECTION, SUSPENSION INTRAMUSCULAR
Qty: 1 ML | Refills: 3 | Status: SHIPPED | OUTPATIENT
Start: 2021-09-15 | End: 2022-03-09 | Stop reason: SDUPTHER

## 2021-09-24 ENCOUNTER — CLINICAL SUPPORT (OUTPATIENT)
Dept: OBSTETRICS AND GYNECOLOGY | Facility: CLINIC | Age: 46
End: 2021-09-24

## 2021-09-24 DIAGNOSIS — Z30.42 ENCOUNTER FOR MANAGEMENT AND INJECTION OF DEPO-PROVERA: Primary | ICD-10-CM

## 2021-09-24 PROCEDURE — 96372 THER/PROPH/DIAG INJ SC/IM: CPT | Performed by: OBSTETRICS & GYNECOLOGY

## 2021-09-24 RX ORDER — MEDROXYPROGESTERONE ACETATE 150 MG/ML
150 INJECTION, SUSPENSION INTRAMUSCULAR ONCE
Status: COMPLETED | OUTPATIENT
Start: 2021-09-24 | End: 2021-09-24

## 2021-09-24 RX ADMIN — MEDROXYPROGESTERONE ACETATE 150 MG: 150 INJECTION, SUSPENSION INTRAMUSCULAR at 17:16

## 2021-10-05 DIAGNOSIS — F32.89 OTHER DEPRESSION: ICD-10-CM

## 2021-10-05 RX ORDER — VENLAFAXINE 75 MG/1
TABLET ORAL
Qty: 120 TABLET | Refills: 1 | Status: SHIPPED | OUTPATIENT
Start: 2021-10-05 | End: 2022-01-21

## 2021-10-28 DIAGNOSIS — I10 ESSENTIAL HYPERTENSION: ICD-10-CM

## 2021-10-28 RX ORDER — INDAPAMIDE 2.5 MG/1
TABLET, FILM COATED ORAL
Qty: 90 TABLET | Refills: 0 | Status: SHIPPED | OUTPATIENT
Start: 2021-10-28 | End: 2021-11-22

## 2021-11-03 ENCOUNTER — OFFICE VISIT (OUTPATIENT)
Dept: FAMILY MEDICINE CLINIC | Facility: CLINIC | Age: 46
End: 2021-11-03

## 2021-11-03 VITALS
DIASTOLIC BLOOD PRESSURE: 90 MMHG | OXYGEN SATURATION: 95 % | HEART RATE: 104 BPM | SYSTOLIC BLOOD PRESSURE: 124 MMHG | RESPIRATION RATE: 16 BRPM

## 2021-11-03 DIAGNOSIS — I10 PRIMARY HYPERTENSION: ICD-10-CM

## 2021-11-03 DIAGNOSIS — Z23 NEED FOR IMMUNIZATION AGAINST INFLUENZA: Primary | ICD-10-CM

## 2021-11-03 DIAGNOSIS — F32.89 OTHER DEPRESSION: ICD-10-CM

## 2021-11-03 PROCEDURE — G0008 ADMIN INFLUENZA VIRUS VAC: HCPCS | Performed by: FAMILY MEDICINE

## 2021-11-03 PROCEDURE — 99213 OFFICE O/P EST LOW 20 MIN: CPT | Performed by: FAMILY MEDICINE

## 2021-11-03 PROCEDURE — 90686 IIV4 VACC NO PRSV 0.5 ML IM: CPT | Performed by: FAMILY MEDICINE

## 2021-11-03 NOTE — PROGRESS NOTES
Subjective   Rehana Cruz is a 45 y.o. female.   ADD and Depression    History of Present Illness   Rehana is here for follow up after shelbie dunn.  She has been well controlled on Effexor for several years but she is not sure if this is part of the problem.  The shelbie dunn diagnosed her with ADD medication and the therapist that she talk to wondered if coming off of this might help.  She is also concerned considering whether or not she would want to take medication for ADD.  She was hoping she could talk to a psychiatrist or therapist to determine whether or not this is the right way to go.  She is asking for help with a therapist first.  She states that she has a fair amount of anxiety and she does not feel that the medication she is taking is working as well for that as she would like.  She also has some depressive symptoms but is managing those pretty well with this medication.    The following portions of the patient's history were reviewed and updated as appropriate: allergies, current medications, past family history, past medical history, past social history, past surgical history and problem list.    Review of Systems   Constitutional: Negative.    HENT: Negative.    Eyes: Negative.    Respiratory: Negative.    Cardiovascular: Negative.    Genitourinary: Negative.    Skin: Negative.    Neurological: Negative.    Psychiatric/Behavioral: Positive for dysphoric mood. The patient is nervous/anxious.        Objective   Physical Exam  Vitals and nursing note reviewed.   Constitutional:       Appearance: Normal appearance.      Comments: In a wheelchair due to spina bifida   HENT:      Head: Normocephalic and atraumatic.   Cardiovascular:      Rate and Rhythm: Normal rate.   Pulmonary:      Effort: Pulmonary effort is normal.      Breath sounds: Normal breath sounds.   Neurological:      Mental Status: She is alert.      Comments: Weakness from back down due to spina bifida   Psychiatric:          Mood and Affect: Mood normal.         Behavior: Behavior normal.         Thought Content: Thought content normal.         Judgment: Judgment normal.           Assessment/Plan   Problem List Items Addressed This Visit        Cardiac and Vasculature    Hypertension  Well managed on current medication.  She will continue to take this medicine and I will refill if necessary.       Mental Health    Depression  She would like to talk to a therapist to sort out whether or not she should change her depression medication or if she should seek treatment for attention deficit disorder.  She is alert and aware and has a good understanding of what she is needing.  She will follow-up with me as necessary.    Relevant Orders    Ambulatory Referral to Psychiatry      Other Visit Diagnoses     Need for immunization against influenza    -  Primary    Relevant Orders    FluLaval/Fluarix/Fluzone >6 Months ([id number]) (Completed)               Return in about 3 months (around 2/3/2022) for Recheck.

## 2021-11-22 DIAGNOSIS — I10 ESSENTIAL HYPERTENSION: ICD-10-CM

## 2021-11-22 RX ORDER — INDAPAMIDE 2.5 MG/1
TABLET, FILM COATED ORAL
Qty: 90 TABLET | Refills: 1 | Status: SHIPPED | OUTPATIENT
Start: 2021-11-22 | End: 2022-08-12

## 2021-12-08 ENCOUNTER — OFFICE VISIT (OUTPATIENT)
Dept: BEHAVIORAL HEALTH | Facility: CLINIC | Age: 46
End: 2021-12-08

## 2021-12-08 DIAGNOSIS — F33.1 MAJOR DEPRESSIVE DISORDER, RECURRENT EPISODE, MODERATE (HCC): Primary | ICD-10-CM

## 2021-12-08 PROCEDURE — 90791 PSYCH DIAGNOSTIC EVALUATION: CPT

## 2021-12-08 NOTE — PROGRESS NOTES
Subjective   Patient ID: Rehana Cruz is a 45 y.o. female is here today as a self referral..     Chief Complaint: Patient reports experiencing symptoms of depression.  Patient also reports having a hyperactive mind, engaging in self sabotaging behaviors in her work environment, attention deficit issues, poor money management, and low self-esteem.    History of Present Illness: Patient reports a history of depression from childhood.  Patient reports she always felt like she had ADHD however reports from the past evaluation she just experiences symptoms.    The following portions of the patient's history were reviewed and updated as appropriate: allergies, current medications, past family history, past medical history, past social history, past surgical history and problem list.    Past Psych History: Patient reports she has engaged in therapy in the past.  Patient reports she had a really good retired so she lost a therapist.  Patient reports this past therapist prescribing her Affexor.  Patient reports she has not therapist for the past 2 years.    Substance Use History: Patient reports marijuana use as a young adult.  Patient reports not really smoking marijuana currently.  Patient reports she smokes cigarettes daily.    Medical History: Patient reports she has spina bifida and has had multiple surgeries due to her disability.  Past Medical History:   Diagnosis Date   • Depression     well managed on current med   • Hemochromatosis     followed by [facility]   • Hyperlipidemia    • Neurogenic bladder     self caths on Bactrim and is followed by urology   • Spina bifida (HCC)     manages ADLs well, drives and lives alone        Past Surgical History:   Procedure Laterality Date   • SKIN GRAFT Right     Buttock       Medications:   Current Outpatient Medications:   •  amLODIPine (NORVASC) 5 MG tablet, TAKE 1 TABLET BY MOUTH DAILY, Disp: 30 tablet, Rfl: 2  •  fluticasone (FLONASE) 50 MCG/ACT nasal spray, 1 spray  into the nostril(s) as directed by provider Daily., Disp: , Rfl:   •  indapamide (LOZOL) 2.5 MG tablet, TAKE 1 TABLET BY MOUTH EVERY MORNING, Disp: 90 tablet, Rfl: 1  •  medroxyPROGESTERone Acetate 150 MG/ML suspension prefilled syringe, INJECT 1 ML IN THE APPROPRIATE MUSCLE ONCE EVERY 3 MONTHS AS DIRECTED, Disp: 1 mL, Rfl: 3  •  oxybutynin XL (DITROPAN XL) 15 MG 24 hr tablet, Take 15 mg by mouth daily. extended release, Disp: , Rfl:   •  sulfamethoxazole-trimethoprim (BACTRIM,SEPTRA) 400-80 MG tablet, TK 1 T PO HS, Disp: , Rfl:   •  triamcinolone (KENALOG) 0.1 % cream, MORTEZA AA BID FOR 10 DAYS UTD, Disp: , Rfl: 2  •  venlafaxine (EFFEXOR) 75 MG tablet, TAKE 2 TABLETS BY MOUTH TWICE DAILY, Disp: 120 tablet, Rfl: 1    Allergies   Allergen Reactions   • Latex Rash       Review of Systems    Family History: Patient reports her mother passed away from cancer while she was a senior in college.  Patient reports her father is living and he remarried 10 years ago.  Patient reports she has 2 brothers and 1 sister living in [place] and [place].  Patient reports she comes from a [employer] conservative family however she does not identify assist.  She reports she gets along with her siblings however does not talk to them about her problems.  Patient reports having a fine relationship with her father but not getting along well with his new wife.  Patient reports her grandmother recently passed away this year 2021 and she was over 100 years old.  Patient reports not having a significant other.  Family History   Problem Relation Age of Onset   • Ovarian cancer Mother         50's   • Multiple sclerosis Father    • Leukemia Paternal Uncle    • Alzheimer's disease Maternal Grandmother    • Leukemia Maternal Grandfather    • Hemochromatosis Brother        Social History: Patient reports she has a good group of friends and considers them family.  She reports about 3-4 friends that she can call on whenever for what ever.  She reports 2  additional friends that she lost contact with her through the pandemic and quarantine.  She reports feeling like they just did not put in the same effort to maintain the friendship as she did.  Patient reports not going out much to do the pandemic.  Patient reports she would like to meet someone romantically but feels stressed in regards to someone being able to look past her disability.    Objective   Mental Status Exam  Hygiene:  good  Dress:  casual  Attitude:  Cooperative  Motor Activity:  Appropriate  Speech:  Normal  Mood:  labile  Affect:  calm and pleasant and labile  Thought Processes:  Goal directed and Linear  Thought Content:  normal  Suicidal Thoughts:  denies  Homicidal Thoughts:  denies  Crisis Safety Plan: yes, to come to the emergency room.  Hallucinations:  denies      Strengths: Patient reports some of her strengths are that she is a good friend, creative, has a calm demeanor, and has great hair that she gets from her mother.    Weaknesses: Patient reports some of her weaknesses include low combatants, poor money management skills, lack of self organization, inability to focus with a hyperactive mind.    Problems include but are not limited to: Anxiety, depression and finacial conflict/vocation stress      Short term goals: Provide safe, confidential environment to facilitate the development of positive therapeutic relationship and encourage open, honest communication. Patient will maintain stability and avoid higher level of care.     Long term goals: The patient will have complete cessation of symptoms and be able to function at optimal levels without the need for continued treatment.      Assessment/Plan   Diagnoses and all orders for this visit:    1. Major depressive disorder, recurrent episode, moderate (HCC) (Primary)        Plan:  Return in 1 week (on 12/15/2021), or 1-2 weeks depending on schedule availability, for Next scheduled follow up.      Patient will continue in individual  outpatient therapy session [facility] every 1-2 weeks and pharmacotherapy as scheduled.  Patient will adhere to medication regimen as prescribed and report any side effects. Patient will contact this office, call 911 or present to the nearest emergency room should suicidal or homicidal ideations occur.       This document signed by Tracie Rehman LCSW December 8, 2021 13:24 EST

## 2021-12-22 DIAGNOSIS — I10 HYPERTENSION, ESSENTIAL: ICD-10-CM

## 2021-12-22 RX ORDER — AMLODIPINE BESYLATE 5 MG/1
5 TABLET ORAL DAILY
Qty: 90 TABLET | Refills: 1 | Status: SHIPPED | OUTPATIENT
Start: 2021-12-22 | End: 2022-06-17

## 2022-01-21 DIAGNOSIS — F32.89 OTHER DEPRESSION: ICD-10-CM

## 2022-01-21 RX ORDER — VENLAFAXINE 75 MG/1
TABLET ORAL
Qty: 120 TABLET | Refills: 1 | Status: SHIPPED | OUTPATIENT
Start: 2022-01-21 | End: 2022-03-21

## 2022-03-09 ENCOUNTER — TELEPHONE (OUTPATIENT)
Dept: OBSTETRICS AND GYNECOLOGY | Facility: CLINIC | Age: 47
End: 2022-03-09

## 2022-03-09 RX ORDER — MEDROXYPROGESTERONE ACETATE 150 MG/ML
1 INJECTION, SUSPENSION INTRAMUSCULAR
Qty: 1 ML | Refills: 3 | Status: SHIPPED | OUTPATIENT
Start: 2022-03-09

## 2022-03-09 NOTE — TELEPHONE ENCOUNTER
Pt coming in 3/18 for depo. Could you put in a prescription to the [facility]'s on [address]? Please advise.    Thanks,  Jules   Son

## 2022-03-18 ENCOUNTER — CLINICAL SUPPORT (OUTPATIENT)
Dept: OBSTETRICS AND GYNECOLOGY | Facility: CLINIC | Age: 47
End: 2022-03-18

## 2022-03-18 VITALS — BODY MASS INDEX: 41.23 KG/M2 | HEIGHT: 60 IN

## 2022-03-18 DIAGNOSIS — Z30.42 ENCOUNTER FOR MANAGEMENT AND INJECTION OF DEPO-PROVERA: Primary | ICD-10-CM

## 2022-03-18 LAB
B-HCG UR QL: NEGATIVE
EXPIRATION DATE: NORMAL
INTERNAL NEGATIVE CONTROL: NORMAL
INTERNAL POSITIVE CONTROL: NORMAL
Lab: NORMAL

## 2022-03-18 PROCEDURE — 81025 URINE PREGNANCY TEST: CPT | Performed by: OBSTETRICS & GYNECOLOGY

## 2022-03-18 PROCEDURE — 96372 THER/PROPH/DIAG INJ SC/IM: CPT | Performed by: OBSTETRICS & GYNECOLOGY

## 2022-03-18 RX ORDER — MEDROXYPROGESTERONE ACETATE 150 MG/ML
150 INJECTION, SUSPENSION INTRAMUSCULAR ONCE
Status: COMPLETED | OUTPATIENT
Start: 2022-03-18 | End: 2022-03-18

## 2022-03-18 RX ADMIN — MEDROXYPROGESTERONE ACETATE 150 MG: 150 INJECTION, SUSPENSION INTRAMUSCULAR at 10:01

## 2022-03-18 NOTE — PROGRESS NOTES
Patient is here today for her Depo-Provera 150 mg. Patient supplied. She states that her sister (RN) gave her injection in December. Office does not have record of this injection, so urine HCG was ran, and resulted negative.  Patient tolerated injection without reaction.     NDC: 52021-378-37  Lot: [id number]  Exp: 11/30/2025    Right Deltoid IM

## 2022-03-19 DIAGNOSIS — F32.89 OTHER DEPRESSION: ICD-10-CM

## 2022-03-21 RX ORDER — VENLAFAXINE 75 MG/1
TABLET ORAL
Qty: 120 TABLET | Refills: 1 | Status: SHIPPED | OUTPATIENT
Start: 2022-03-21 | End: 2022-05-18

## 2022-05-18 DIAGNOSIS — F32.89 OTHER DEPRESSION: ICD-10-CM

## 2022-05-18 RX ORDER — VENLAFAXINE 75 MG/1
TABLET ORAL
Qty: 120 TABLET | Refills: 1 | Status: SHIPPED | OUTPATIENT
Start: 2022-05-18 | End: 2022-09-15

## 2022-06-15 ENCOUNTER — PROCEDURE VISIT (OUTPATIENT)
Dept: OBSTETRICS AND GYNECOLOGY | Facility: CLINIC | Age: 47
End: 2022-06-15

## 2022-06-15 ENCOUNTER — APPOINTMENT (OUTPATIENT)
Dept: WOMENS IMAGING | Facility: HOSPITAL | Age: 47
End: 2022-06-15

## 2022-06-15 ENCOUNTER — OFFICE VISIT (OUTPATIENT)
Dept: OBSTETRICS AND GYNECOLOGY | Facility: CLINIC | Age: 47
End: 2022-06-15

## 2022-06-15 VITALS
BODY MASS INDEX: 41.23 KG/M2 | HEART RATE: 96 BPM | SYSTOLIC BLOOD PRESSURE: 116 MMHG | DIASTOLIC BLOOD PRESSURE: 73 MMHG | WEIGHT: 210 LBS

## 2022-06-15 DIAGNOSIS — Z01.419 ENCOUNTER FOR GYNECOLOGICAL EXAMINATION: Primary | ICD-10-CM

## 2022-06-15 DIAGNOSIS — Z12.31 VISIT FOR SCREENING MAMMOGRAM: Primary | ICD-10-CM

## 2022-06-15 PROCEDURE — 77067 SCR MAMMO BI INCL CAD: CPT | Performed by: OBSTETRICS & GYNECOLOGY

## 2022-06-15 PROCEDURE — 77067 SCR MAMMO BI INCL CAD: CPT | Performed by: RADIOLOGY

## 2022-06-15 PROCEDURE — 99396 PREV VISIT EST AGE 40-64: CPT | Performed by: OBSTETRICS & GYNECOLOGY

## 2022-06-15 PROCEDURE — 77063 BREAST TOMOSYNTHESIS BI: CPT | Performed by: RADIOLOGY

## 2022-06-15 PROCEDURE — 77063 BREAST TOMOSYNTHESIS BI: CPT | Performed by: OBSTETRICS & GYNECOLOGY

## 2022-06-15 NOTE — PROGRESS NOTES
"Chief Complaint  Annual Exam - Pap- 2021 Mammo- Today  Subjective        Rehana Cruz presents to [facility]  History of Present Illness  Patient is a 46-year-old female that presents for gynecological exam.  She uses Depo-Provera for amenorrhea and has no complaints.  She is currently due for her next Depo-Provera.  She does have a family history of ovarian cancer in her mother and has had BRCA testing that was negative.  Objective   Vital Signs:  /73   Pulse 96   Wt 95.3 kg (210 lb)   BMI 41.23 kg/m²   Estimated body mass index is 41.23 kg/m² as calculated from the following:    Height as of 3/18/22: 152 cm (59.84\").    Weight as of this encounter: 95.3 kg (210 lb).          Physical Exam  Vitals reviewed. Exam conducted with a chaperone present.   Constitutional:       Appearance: She is well-developed.   Cardiovascular:      Rate and Rhythm: Normal rate and regular rhythm.   Pulmonary:      Effort: Pulmonary effort is normal.      Breath sounds: Normal breath sounds.   Chest:   Breasts:      Right: No inverted nipple, mass, nipple discharge, skin change or tenderness.      Left: No inverted nipple, mass, nipple discharge, skin change or tenderness.       Abdominal:      General: There is no distension.      Palpations: Abdomen is soft.      Tenderness: There is no abdominal tenderness.   Genitourinary:     Labia:         Right: No rash, tenderness, lesion or injury.         Left: No rash, tenderness, lesion or injury.       Vagina: Normal.      Cervix: Normal.      Uterus: Normal.       Adnexa:         Right: No mass, tenderness or fullness.          Left: No mass, tenderness or fullness.     Neurological:      Mental Status: She is alert.        Result Review :                Assessment and Plan   Diagnoses and all orders for this visit:    1. Encounter for gynecological examination (Primary)    She was counseled on monthly self breast exams and continuing with yearly " screening mammograms for breast health.  She was also counseled on recommendations for colon cancer screening starting at the age of 45.  She will continue on her Depo-Provera and will have her sister-in-law administer it.  She may follow-up with me in 1 year for gynecological exam or sooner if needed.         Follow Up   Return in about 1 year (around 6/15/2023) for gynecological exam.  Patient was given instructions and counseling regarding her condition or for health maintenance advice. Please see specific information pulled into the AVS if appropriate.

## 2022-06-17 DIAGNOSIS — I10 HYPERTENSION, ESSENTIAL: ICD-10-CM

## 2022-06-17 RX ORDER — AMLODIPINE BESYLATE 5 MG/1
5 TABLET ORAL DAILY
Qty: 90 TABLET | Refills: 0 | Status: SHIPPED | OUTPATIENT
Start: 2022-06-17 | End: 2022-12-12

## 2022-06-17 NOTE — TELEPHONE ENCOUNTER
Dr. De La Rosa pt    Last OV: 1/5/2022    Next OV: not scheduled  (overdue since 2/3/2022)    Last Refill: 12/22/2021

## 2022-08-08 ENCOUNTER — TELEPHONE (OUTPATIENT)
Dept: OBSTETRICS AND GYNECOLOGY | Facility: CLINIC | Age: 47
End: 2022-08-08

## 2022-08-08 NOTE — TELEPHONE ENCOUNTER
Pt called to report that she gave herself her Depo shot.   Done on 6/16  Left arm  exp 11/20/2025  Lot # [id number]

## 2022-08-12 DIAGNOSIS — I10 ESSENTIAL HYPERTENSION: ICD-10-CM

## 2022-08-12 RX ORDER — INDAPAMIDE 2.5 MG/1
TABLET, FILM COATED ORAL
Qty: 90 TABLET | Refills: 1 | Status: SHIPPED | OUTPATIENT
Start: 2022-08-12 | End: 2023-03-21 | Stop reason: SDUPTHER

## 2022-08-12 RX ORDER — INDAPAMIDE 2.5 MG/1
TABLET, FILM COATED ORAL
Qty: 90 TABLET | Refills: 1 | Status: SHIPPED | OUTPATIENT
Start: 2022-08-12

## 2022-09-15 DIAGNOSIS — F32.89 OTHER DEPRESSION: ICD-10-CM

## 2022-09-15 RX ORDER — VENLAFAXINE 75 MG/1
150 TABLET ORAL 2 TIMES DAILY
Qty: 120 TABLET | Refills: 0 | Status: SHIPPED | OUTPATIENT
Start: 2022-09-15 | End: 2022-10-17

## 2022-10-15 DIAGNOSIS — F32.89 OTHER DEPRESSION: ICD-10-CM

## 2022-10-17 RX ORDER — VENLAFAXINE 75 MG/1
TABLET ORAL
Qty: 120 TABLET | Refills: 0 | Status: SHIPPED | OUTPATIENT
Start: 2022-10-17 | End: 2022-11-11

## 2022-11-03 ENCOUNTER — TELEPHONE (OUTPATIENT)
Dept: FAMILY MEDICINE CLINIC | Facility: CLINIC | Age: 47
End: 2022-11-03

## 2022-11-03 NOTE — TELEPHONE ENCOUNTER
Elizabeth with [employer] called regarding a form that was faxed for this patient call back number is [phone].

## 2022-11-10 ENCOUNTER — TELEPHONE (OUTPATIENT)
Dept: FAMILY MEDICINE CLINIC | Facility: CLINIC | Age: 47
End: 2022-11-10

## 2022-11-10 NOTE — TELEPHONE ENCOUNTER
[employer] called wanting to verify Dr. De La Rosa signature on the form for the wheelchair repair. Asking to speak to someone.  [phone]

## 2022-11-11 DIAGNOSIS — F32.89 OTHER DEPRESSION: ICD-10-CM

## 2022-11-11 RX ORDER — VENLAFAXINE 75 MG/1
TABLET ORAL
Qty: 60 TABLET | Refills: 0 | Status: SHIPPED | OUTPATIENT
Start: 2022-11-11 | End: 2022-12-15

## 2022-12-11 DIAGNOSIS — I10 HYPERTENSION, ESSENTIAL: ICD-10-CM

## 2022-12-12 RX ORDER — AMLODIPINE BESYLATE 5 MG/1
TABLET ORAL
Qty: 90 TABLET | Refills: 0 | Status: SHIPPED | OUTPATIENT
Start: 2022-12-12 | End: 2023-02-13

## 2022-12-15 DIAGNOSIS — F32.89 OTHER DEPRESSION: ICD-10-CM

## 2022-12-15 RX ORDER — VENLAFAXINE 75 MG/1
TABLET ORAL
Qty: 30 TABLET | Refills: 0 | Status: SHIPPED | OUTPATIENT
Start: 2022-12-15 | End: 2022-12-27

## 2022-12-26 DIAGNOSIS — F32.89 OTHER DEPRESSION: ICD-10-CM

## 2022-12-27 RX ORDER — VENLAFAXINE 75 MG/1
TABLET ORAL
Qty: 120 TABLET | Refills: 0 | Status: SHIPPED | OUTPATIENT
Start: 2022-12-27 | End: 2023-01-25

## 2023-01-04 ENCOUNTER — TELEPHONE (OUTPATIENT)
Dept: FAMILY MEDICINE CLINIC | Facility: CLINIC | Age: 48
End: 2023-01-04
Payer: MEDICARE

## 2023-01-04 NOTE — TELEPHONE ENCOUNTER
Jordyn from [employer] called to follow up on a fax that was sent on 12/28 requesting an order for a repair on power wheelchair. Please advise.     Best c/b number [phone]

## 2023-01-25 DIAGNOSIS — F32.89 OTHER DEPRESSION: ICD-10-CM

## 2023-01-25 RX ORDER — VENLAFAXINE 75 MG/1
TABLET ORAL
Qty: 120 TABLET | Refills: 0 | Status: SHIPPED | OUTPATIENT
Start: 2023-01-25 | End: 2023-02-20

## 2023-02-12 DIAGNOSIS — I10 HYPERTENSION, ESSENTIAL: ICD-10-CM

## 2023-02-13 RX ORDER — AMLODIPINE BESYLATE 5 MG/1
TABLET ORAL
Qty: 90 TABLET | Refills: 0 | Status: SHIPPED | OUTPATIENT
Start: 2023-02-13 | End: 2023-03-13

## 2023-02-20 DIAGNOSIS — F32.89 OTHER DEPRESSION: ICD-10-CM

## 2023-02-20 RX ORDER — VENLAFAXINE 75 MG/1
TABLET ORAL
Qty: 120 TABLET | Refills: 0 | Status: SHIPPED | OUTPATIENT
Start: 2023-02-20 | End: 2023-03-27

## 2023-03-11 DIAGNOSIS — I10 HYPERTENSION, ESSENTIAL: ICD-10-CM

## 2023-03-13 RX ORDER — AMLODIPINE BESYLATE 5 MG/1
TABLET ORAL
Qty: 30 TABLET | Refills: 0 | Status: SHIPPED | OUTPATIENT
Start: 2023-03-13 | End: 2023-03-21

## 2023-03-17 ENCOUNTER — TELEPHONE (OUTPATIENT)
Dept: FAMILY MEDICINE CLINIC | Facility: CLINIC | Age: 48
End: 2023-03-17
Payer: MEDICARE

## 2023-03-17 NOTE — TELEPHONE ENCOUNTER
Patient has schedule labs for Monday having a physical later in the week, asking if Dr. De La Rosa would have a ferritin level to her labs.

## 2023-03-21 ENCOUNTER — OFFICE VISIT (OUTPATIENT)
Dept: FAMILY MEDICINE CLINIC | Facility: CLINIC | Age: 48
End: 2023-03-21
Payer: MEDICARE

## 2023-03-21 VITALS
RESPIRATION RATE: 18 BRPM | HEART RATE: 97 BPM | SYSTOLIC BLOOD PRESSURE: 170 MMHG | OXYGEN SATURATION: 97 % | DIASTOLIC BLOOD PRESSURE: 110 MMHG

## 2023-03-21 DIAGNOSIS — R31.0 GROSS HEMATURIA: ICD-10-CM

## 2023-03-21 DIAGNOSIS — Z23 NEED FOR VACCINATION: ICD-10-CM

## 2023-03-21 DIAGNOSIS — Q05.7 SPINA BIFIDA OF LUMBOSACRAL REGION WITHOUT HYDROCEPHALUS: ICD-10-CM

## 2023-03-21 DIAGNOSIS — Z00.00 MEDICARE ANNUAL WELLNESS VISIT, SUBSEQUENT: Primary | ICD-10-CM

## 2023-03-21 DIAGNOSIS — E83.110 HEREDITARY HEMOCHROMATOSIS: ICD-10-CM

## 2023-03-21 DIAGNOSIS — I10 PRIMARY HYPERTENSION: ICD-10-CM

## 2023-03-21 DIAGNOSIS — Z12.11 COLON CANCER SCREENING: ICD-10-CM

## 2023-03-21 DIAGNOSIS — Z30.013 ENCOUNTER FOR INITIAL PRESCRIPTION OF INJECTABLE CONTRACEPTIVE: ICD-10-CM

## 2023-03-21 DIAGNOSIS — Q76.0 SPINA BIFIDA OCCULTA: ICD-10-CM

## 2023-03-21 LAB
B-HCG UR QL: NEGATIVE
BILIRUB BLD-MCNC: NEGATIVE MG/DL
CLARITY, POC: CLEAR
COLOR UR: ABNORMAL
EXPIRATION DATE: NORMAL
GLUCOSE UR STRIP-MCNC: NEGATIVE MG/DL
INTERNAL NEGATIVE CONTROL: NEGATIVE
INTERNAL POSITIVE CONTROL: POSITIVE
KETONES UR QL: NEGATIVE
LEUKOCYTE EST, POC: NEGATIVE
Lab: NORMAL
NITRITE UR-MCNC: NEGATIVE MG/ML
PH UR: 7.5 [PH] (ref 5–8)
PROT UR STRIP-MCNC: NEGATIVE MG/DL
RBC # UR STRIP: ABNORMAL /UL
SP GR UR: 1.01 (ref 1–1.03)
UROBILINOGEN UR QL: NORMAL

## 2023-03-21 RX ORDER — MIRABEGRON 50 MG/1
50 TABLET, FILM COATED, EXTENDED RELEASE ORAL DAILY
COMMUNITY
Start: 2023-01-16

## 2023-03-21 RX ORDER — DILTIAZEM HYDROCHLORIDE 120 MG/1
120 CAPSULE, EXTENDED RELEASE ORAL DAILY
Qty: 30 CAPSULE | Refills: 1 | Status: SHIPPED | OUTPATIENT
Start: 2023-03-21

## 2023-03-21 NOTE — PATIENT INSTRUCTIONS
Your Annual Physical  Personal Prevention Plan      Date of Office Visit:    Encounter Provider:  Rogelio De La Rosa MD  Place of Service:  [facility]  Patient Name: Rehana Cruz  :  1975    As part of the Annual Physical portion of your visit today, we are providing you with this personalized preventive plan of services (PPPS). This plan is based upon recommendations of the [place] Preventive Services Task Force (USPSTF) and the Advisory Committee on Immunization Practices (ACIP).    This lists the preventive care services that should be considered, and provides dates of when you are due. Items listed as completed are up-to-date and do not require any further intervention.    Health Maintenance   Topic Date Due    COLORECTAL CANCER SCREENING  Never done    Pneumococcal Vaccine 0-64 (2 - PCV) 2020    COVID-19 Vaccine (4 - Booster for Moderna series) 2022    INFLUENZA VACCINE  2023 (Originally 2022)    LIPID PANEL  2024    ANNUAL WELLNESS VISIT  2024    PAP SMEAR  2024    TDAP/TD VACCINES (2 - Td or Tdap) 2026    HEPATITIS C SCREENING  Completed       Orders Placed This Encounter   Procedures    COVID-19 Bivalent Booster (Pfizer) 12+yrs    Pneumococcal Conjugate Vaccine 20-Valent (PCV20)    Cologuard - Stool, Per Rectum     Standing Status:   Future     Standing Expiration Date:   3/21/2024     Order Specific Question:   Release to patient     Answer:   Routine Release       Return in about 1 week (around 3/28/2023).

## 2023-03-21 NOTE — PROGRESS NOTES
Medicare Subsequent Wellness Visit  Subjective   Rehana Cruz is a 47 y.o. female who presents for an Medicare Wellness Visit.   Patient Care Team:  Rogelio De La Rosa MD as PCP - General    Recent Hospitalizations:  none    Age-appropriate Screening Schedule:  Refer to the list below for future screening recommendations based on patient's age. The patient has been provided with a written plan.    Health Maintenance   Topic Date Due   • COLORECTAL CANCER SCREENING  Never done   • INFLUENZA VACCINE  03/31/2023 (Originally 8/1/2022)   • LIPID PANEL  03/20/2024   • ANNUAL WELLNESS VISIT  03/21/2024   • PAP SMEAR  06/09/2024   • TDAP/TD VACCINES (2 - Td or Tdap) 03/29/2026   • HEPATITIS C SCREENING  Completed   • COVID-19 Vaccine  Completed   • Pneumococcal Vaccine 0-64  Completed     Outpatient Medications Prior to Visit   Medication Sig Dispense Refill   • fluticasone (FLONASE) 50 MCG/ACT nasal spray 1 spray into the nostril(s) as directed by provider Daily.     • indapamide (LOZOL) 2.5 MG tablet TAKE 1 TABLET BY MOUTH EVERY MORNING 90 tablet 1   • medroxyPROGESTERone Acetate 150 MG/ML suspension prefilled syringe Inject 1 mL into the appropriate muscle as directed by prescriber Every 3 (Three) Months. 1 mL 3   • Myrbetriq 50 MG tablet sustained-release 24 hour 24 hr tablet Take 50 mg by mouth Daily.     • sulfamethoxazole-trimethoprim (BACTRIM,SEPTRA) 400-80 MG tablet TK 1 T PO HS     • venlafaxine (EFFEXOR) 75 MG tablet TAKE 2 TABLETS BY MOUTH TWICE DAILY 120 tablet 0   • amLODIPine (NORVASC) 5 MG tablet TAKE 1 TABLET BY MOUTH EVERY DAY 30 tablet 0   • indapamide (LOZOL) 2.5 MG tablet TAKE 1 TABLET BY MOUTH EVERY MORNING 90 tablet 1   • oxybutynin XL (DITROPAN XL) 15 MG 24 hr tablet Take 15 mg by mouth daily. extended release     • triamcinolone (KENALOG) 0.1 % cream MORTEZA AA BID FOR 10 DAYS UTD (Patient not taking: Reported on 3/21/2023)  2     No facility-administered medications prior to visit.      Patient  Active Problem List   Diagnosis   • Spina bifida occulta   • Depression   • Neurogenic bladder   • Hemochromatosis   • Primary hypertension   • Hereditary hemochromatosis (HCC)   • Morbidly obese (HCC)   • Current smoker   • Hypokalemia     Depression Screen:   PHQ-2/PHQ-9 Depression Screening 3/21/2023   Retired PHQ-9 Total Score -   Retired Total Score -   Little Interest or Pleasure in Doing Things 1-->several days   Feeling Down, Depressed or Hopeless 0-->not at all   PHQ-9: Brief Depression Severity Measure Score 1       Functional and Cognitive Screening:  Functional & Cognitive Status 3/21/2023   Do you have difficulty preparing food and eating? No   Do you have difficulty bathing yourself, getting dressed or grooming yourself? No   Do you have difficulty using the toilet? No   Do you have difficulty moving around from place to place? No   Do you have trouble with steps or getting out of a bed or a chair? Yes   Current Diet Well Balanced Diet   Dental Exam Up to date   Eye Exam Up to date   Exercise (times per week) 0 times per week   Current Exercises Include No Regular Exercise   Current Exercise Activities Include -   Do you need help using the phone?  No   Are you deaf or do you have serious difficulty hearing?  No   Do you need help with transportation? No   Do you need help shopping? No   Do you need help preparing meals?  No   Do you need help with housework?  No   Do you need help with laundry? No   Do you need help taking your medications? No   Do you need help managing money? No   Do you ever drive or ride in a car without wearing a seat belt? No   Have you felt unusual stress, anger or loneliness in the last month? No   Who do you live with? Alone   If you need help, do you have trouble finding someone available to you? No   Have you been bothered in the last four weeks by sexual problems? No   Do you have difficulty concentrating, remembering or making decisions? No     Does the patient have  evidence of cognitive impairment?     Compared to one year ago, the patient feels their physical health and mental health are the same.     Class 3 Severe Obesity (BMI >=40). Obesity-related health conditions include the following: none. Obesity is unchanged. BMI is is above average; no BMI management plan is appropriate. We discussed portion control.       Objective     Vitals:    03/21/23 1328   BP: (!) 170/110   Pulse: 97   Resp: 18   SpO2: 97%     There is no height or weight on file to calculate BMI.    Follow Up:  Medicare Well visit in one year    ADVANCED DIRECTIVES:   ACP discussion was declined by the patient. Patient does not have an advance directive, information provided.    An After Visit Summary and PPPS with all of these plans were given to the patient.     Additional E&M Note during same encounter follows:  Patient has multiple medical problems which are significant and separately identifiable that require additional work above and beyond the Medicare Wellness Visit.      Subjective   Rehana Cruz is a 47 y.o. female here today for Medicare well visit and who also presents for management of chronic concerns including hypertension, spina bifida, depression and she is asking us to help her take her medroxyprogesterone injection today.    HPI   Rehana has a lifelong history of spina bifida occulta.  She she also has a related problem of neuromuscular scoliosis and she reports that she underwent several surgeries for spina bifida as a child.  As a result of this she has chronic back pain.  She reports weakness in her legs that have gotten worse with time.  She has a neurogenic bladder.  She does not present with any paresthesias or myelopathy or saddle anesthesia.    Rehana has chronic anxiety and depression and states that she is getting good relief from symptoms on current medication, venlafaxine 75 mg a day. This is a chronic problem that is responding well to treatment. She is here for  regular 6 month monitoring of response to therapy and reports no intolerable side effects to medication and reports that this medication helps lift mood and makes daily life, relationships better. No thoughts of self harm expressed.    She has chronic primary hypertension and has been controlled well on medication in the past.  She has been taking indapamide, 2.5 mg a day and amlodipine 5 mg a day.  This combination is not holding her blood pressure and she is elevated in the office today.  I have advised her to stop amlodipine and I have added diltiazem  mg a day and she will follow-up with me in 1 week.      Review of Systems   HENT: Negative.    Eyes: Negative.    Respiratory: Negative.    Cardiovascular: Negative.    Genitourinary: Negative.    Musculoskeletal: Positive for back pain and gait problem.   Skin: Negative.    Neurological: Positive for weakness.   Psychiatric/Behavioral: Negative for dysphoric mood.          PHYSICAL EXAM  Vitals reviewed   HEENT: PERRLA, EOMI. Oral mucosa moist,   No LAD.  CV: RRR, no murmurs, rubs, clicks or gallops  LUNGS: CTA bilaterally  EXT: Upper extremities have full range of motion and no concerns.  Bilateral lower legs are weak and unable to bear weight.  No feeling in feet.  Feet are edematous.  NEURO: CN II - XII grossly intact  PSYCH: good mood, positive affect, alert and engaged. No thoughts of self harm  expressed.  Assessment & Plan   Problem List Items Addressed This Visit        Cardiac and Vasculature    Primary hypertension    Overview     She is not well controlled on current medication and I have changed her medication as noted above to diltiazem  mg a day and she will continue taking indapamide 2.5 mg a day.  She will follow-up with me in 1 week so we can evaluate her response to therapy.         Relevant Medications    dilTIAZem XR (DILACOR XR) 120 MG 24 hr capsule       Musculoskeletal and Injuries    Spina bifida occulta    Overview     Rehana  has a lifelong history of spina bifida occulta.  She manages ADLs well, drives and lives alone.  This condition has left her with chronic back pain and significant weakness in both legs.  She has a neurogenic bladder.  She does not present with any paresthesias or myelopathy or saddle anesthesia.        Other Visit Diagnoses     Medicare annual wellness visit, subsequent    -  Primary    Need for vaccination        Relevant Orders    COVID-19 Bivalent Booster (Pfizer) 12+yrs (Completed)    Pneumococcal Conjugate Vaccine 20-Valent (PCV20) (Completed)    Colon cancer screening        Relevant Orders    Cologuard - Stool, Per Rectum    Encounter for initial prescription of injectable contraceptive        Relevant Orders    POCT pregnancy, urine (Completed)    Gross hematuria      A urine culture has been ordered.    Relevant Orders    Urine Culture - Urine, Urine, Clean Catch    POCT urinalysis dipstick, manual (Completed)           Orders Placed This Encounter   Procedures   • Urine Culture - Urine, Urine, Clean Catch   • COVID-19 Bivalent Booster (Pfizer) 12+yrs   • Pneumococcal Conjugate Vaccine 20-Valent (PCV20)   • Cologuard - Stool, Per Rectum   • POCT pregnancy, urine   • POCT urinalysis dipstick, manual        Return in about 1 week (around 3/28/2023).

## 2023-03-23 LAB
BACTERIA UR CULT: NO GROWTH
BACTERIA UR CULT: NORMAL

## 2023-03-24 DIAGNOSIS — Z79.899 HIGH RISK MEDICATION USE: ICD-10-CM

## 2023-03-24 DIAGNOSIS — R31.0 GROSS HEMATURIA: Primary | ICD-10-CM

## 2023-03-26 DIAGNOSIS — F32.89 OTHER DEPRESSION: ICD-10-CM

## 2023-03-27 RX ORDER — VENLAFAXINE 75 MG/1
TABLET ORAL
Qty: 120 TABLET | Refills: 0 | Status: SHIPPED | OUTPATIENT
Start: 2023-03-27

## 2023-03-28 ENCOUNTER — OFFICE VISIT (OUTPATIENT)
Dept: FAMILY MEDICINE CLINIC | Facility: CLINIC | Age: 48
End: 2023-03-28
Payer: MEDICARE

## 2023-03-28 VITALS
SYSTOLIC BLOOD PRESSURE: 122 MMHG | RESPIRATION RATE: 16 BRPM | HEART RATE: 99 BPM | OXYGEN SATURATION: 99 % | DIASTOLIC BLOOD PRESSURE: 86 MMHG

## 2023-03-28 DIAGNOSIS — Z74.09 MUSCULOSKELETAL IMMOBILITY: ICD-10-CM

## 2023-03-28 DIAGNOSIS — I10 PRIMARY HYPERTENSION: Primary | ICD-10-CM

## 2023-03-28 DIAGNOSIS — Q76.0 SPINA BIFIDA OCCULTA: ICD-10-CM

## 2023-03-28 LAB
ALBUMIN SERPL-MCNC: 4.5 G/DL (ref 3.8–4.8)
ALBUMIN/GLOB SERPL: 1.8 {RATIO} (ref 1.2–2.2)
ALP SERPL-CCNC: 116 IU/L (ref 44–121)
ALT SERPL-CCNC: 16 IU/L (ref 0–32)
AST SERPL-CCNC: 16 IU/L (ref 0–40)
BILIRUB SERPL-MCNC: 0.2 MG/DL (ref 0–1.2)
BUN SERPL-MCNC: 17 MG/DL (ref 6–24)
BUN/CREAT SERPL: 35 (ref 9–23)
CALCIUM SERPL-MCNC: 9.6 MG/DL (ref 8.7–10.2)
CHLORIDE SERPL-SCNC: 96 MMOL/L (ref 96–106)
CO2 SERPL-SCNC: 25 MMOL/L (ref 20–29)
CREAT SERPL-MCNC: 0.48 MG/DL (ref 0.57–1)
EGFRCR SERPLBLD CKD-EPI 2021: 118 ML/MIN/1.73
GLOBULIN SER CALC-MCNC: 2.5 G/DL (ref 1.5–4.5)
GLUCOSE SERPL-MCNC: 99 MG/DL (ref 70–99)
GLUCOSE UR QL STRIP: NORMAL
KETONES UR QL STRIP: NORMAL
PH UR STRIP: NORMAL [PH]
POTASSIUM SERPL-SCNC: 3 MMOL/L (ref 3.5–5.2)
PROT SERPL-MCNC: 7 G/DL (ref 6–8.5)
PROT UR QL STRIP: NORMAL
SODIUM SERPL-SCNC: 139 MMOL/L (ref 134–144)
SP GR UR STRIP: NORMAL

## 2023-03-28 PROCEDURE — 1160F RVW MEDS BY RX/DR IN RCRD: CPT | Performed by: FAMILY MEDICINE

## 2023-03-28 PROCEDURE — 3079F DIAST BP 80-89 MM HG: CPT | Performed by: FAMILY MEDICINE

## 2023-03-28 PROCEDURE — 99214 OFFICE O/P EST MOD 30 MIN: CPT | Performed by: FAMILY MEDICINE

## 2023-03-28 PROCEDURE — 1159F MED LIST DOCD IN RCRD: CPT | Performed by: FAMILY MEDICINE

## 2023-03-28 PROCEDURE — 3074F SYST BP LT 130 MM HG: CPT | Performed by: FAMILY MEDICINE

## 2023-03-28 NOTE — PROGRESS NOTES
Subjective   Rehana Cruz is a 47 y.o. female.   Hypertension and Immobility    History of Present Illness   Rehana is here for bp check.  She states she is doing well on the Diltiazem and reports no side effects.  Rehana has chronic hypertension and has been well controlled on current medications.She  is monitored by me every 6 months and is here today for follow up. She is tolerating medications without side effect. She reports no vision changes, headaches or lightheadedness. She is requesting refills of medications.  She states she has been in contact w/ her urologist re: hematuria and she states all has resolved.    Rehana is also here today for a face to face mobility exam.  Pt has Spina Bifida and is wheelchair bound.  Pt does not ambulate.  Pt can transfer independently.  Due to her stature and body habitus a power wheelchair or scooter would not fit her well and could definitely lead to skin breakdown and vascular issues as well as safety issues due to her lack of balance.  Patient can power her tiff wheelchair on level surfaces and is independent w/ bathing, grooming and bowel and bladder program. She requires a power assist to her tiff wheelchair for propelling her chair any long distance due to the energy she expends and the stress she places on her shoulders.  The power assist tiff wheelchair is necessary to decrease fatigue and for continued independence as much as possible within her home.     Review of Systems   Constitutional: Negative.    HENT: Negative.    Eyes: Negative.    Respiratory: Negative.    Cardiovascular: Negative.    Genitourinary: Negative.    Skin: Negative.    Neurological: Negative.        Objective   Vitals:    03/28/23 1406   BP: 122/86   Pulse: 99   Resp: 16   SpO2: 99%   Weight: Comment: wheelchair      There is no height or weight on file to calculate BMI.    Physical Exam  Vitals and nursing note reviewed.   Constitutional:       Appearance: She is  well-developed.   HENT:      Head: Normocephalic and atraumatic.   Eyes:      Pupils: Pupils are equal, round, and reactive to light.   Cardiovascular:      Rate and Rhythm: Normal rate and regular rhythm.      Heart sounds: Normal heart sounds.   Pulmonary:      Effort: Pulmonary effort is normal.      Breath sounds: Normal breath sounds.   Musculoskeletal:         General: Normal range of motion.      Cervical back: Normal range of motion and neck supple.   Skin:     General: Skin is warm and dry.      Findings: No rash.   Neurological:      Mental Status: She is alert and oriented to person, place, and time.           Assessment & Plan   Problem List Items Addressed This Visit        Cardiac and Vasculature    Primary hypertension - Primary    Overview     She is now well controlled on indapamide 2.5 mg a day and diltiazem  mg a day. She notices no side effects. She is willing to continue this dose and will f/u with me in one month.             Musculoskeletal and Injuries    Spina bifida occulta    Overview     Rehana has a lifelong history of spina bifida occulta.  She manages ADLs well, drives and lives alone.  This condition has left her with chronic back pain and significant weakness in both legs.  She has a neurogenic bladder.  She does not present with any paresthesias or myelopathy or saddle anesthesia.            Symptoms and Signs    Musculoskeletal immobility          No orders of the defined types were placed in this encounter.       Return in about 4 weeks (around 4/25/2023).

## 2023-04-02 PROBLEM — Z74.09 MUSCULOSKELETAL IMMOBILITY: Status: ACTIVE | Noted: 2023-04-02

## 2023-04-29 DIAGNOSIS — F32.89 OTHER DEPRESSION: ICD-10-CM

## 2023-05-01 RX ORDER — VENLAFAXINE 75 MG/1
TABLET ORAL
Qty: 120 TABLET | Refills: 0 | Status: SHIPPED | OUTPATIENT
Start: 2023-05-01

## 2023-05-29 DIAGNOSIS — F32.89 OTHER DEPRESSION: ICD-10-CM

## 2023-05-30 DIAGNOSIS — F32.89 OTHER DEPRESSION: ICD-10-CM

## 2023-05-30 DIAGNOSIS — I10 PRIMARY HYPERTENSION: ICD-10-CM

## 2023-05-30 RX ORDER — VENLAFAXINE 75 MG/1
TABLET ORAL
Qty: 120 TABLET | Refills: 0 | OUTPATIENT
Start: 2023-05-30

## 2023-05-30 RX ORDER — VENLAFAXINE 75 MG/1
150 TABLET ORAL 2 TIMES DAILY
Qty: 120 TABLET | Refills: 0 | Status: SHIPPED | OUTPATIENT
Start: 2023-05-30

## 2023-05-30 RX ORDER — DILTIAZEM HYDROCHLORIDE 120 MG/1
120 CAPSULE, EXTENDED RELEASE ORAL DAILY
Qty: 30 CAPSULE | Refills: 1 | Status: SHIPPED | OUTPATIENT
Start: 2023-05-30

## 2023-05-30 NOTE — TELEPHONE ENCOUNTER
Caller: Rehana Cruz    Relationship: Self    Best call back number: [phone]    Requested Prescriptions:   Requested Prescriptions     Pending Prescriptions Disp Refills   • dilTIAZem XR (DILACOR XR) 120 MG 24 hr capsule 30 capsule 1     Sig: Take 1 capsule by mouth Daily.   • venlafaxine (EFFEXOR) 75 MG tablet 120 tablet 0     Sig: Take 2 tablets by mouth 2 (Two) Times a Day.        Pharmacy where request should be sent: [id number] [address] AT [facility] [address]  [phone] [facility] [phone] FX     Last office visit with prescribing clinician: 3/28/2023   Last telemedicine visit with prescribing clinician: Visit date not found   Next office visit with prescribing clinician: Visit date not found     Additional details provided by patient: PATIENT STATES THAT SHE IS OUT OF VENLAFAXINE AT THIS TIME    Does the patient have less than a 3 day supply:  [x] Yes  [] No    Would you like a call back once the refill request has been completed: [] Yes [x] No    If the office needs to give you a call back, can they leave a voicemail: [] Yes [x] No    Nathaniel Staton Rep   05/30/23 11:33 EDT

## 2023-06-14 RX ORDER — MEDROXYPROGESTERONE ACETATE 150 MG/ML
1 INJECTION, SUSPENSION INTRAMUSCULAR
Qty: 1 ML | Refills: 3 | Status: SHIPPED | OUTPATIENT
Start: 2023-06-14

## 2023-06-14 NOTE — TELEPHONE ENCOUNTER
Caller: Rehana Cruz    Relationship: Self    Best call back number: [phone]    Requested Prescriptions:   Requested Prescriptions     Pending Prescriptions Disp Refills   • medroxyPROGESTERone Acetate 150 MG/ML suspension prefilled syringe 1 mL 3     Sig: Inject 1 mL into the appropriate muscle as directed by prescriber Every 3 (Three) Months.        Pharmacy where request should be sent: [facility]     Last office visit with prescribing clinician: 6/15/2022   Last telemedicine visit with prescribing clinician: Visit date not found   Next office visit with prescribing clinician: 10/3/2023     Additional details provided by patient: PT DID LAST DEPO 3/21/23 AND STATES SHE WILL BE DUE 6/21/23    Does the patient have less than a 3 day supply:  [] Yes  [] No    Would you like a call back once the refill request has been completed: [x] Yes [] No    If the office needs to give you a call back, can they leave a voicemail: [x] Yes [] No    Nathaniel Hoffman Rep   06/14/23 10:36 EDT

## 2023-06-14 NOTE — TELEPHONE ENCOUNTER
Med refill. AE & Mx 6/15/22. Scheduled Mx 9/28/23, AE 10/3/23. [facility] pharmacy. Thank you

## 2023-07-06 ENCOUNTER — TELEPHONE (OUTPATIENT)
Dept: FAMILY MEDICINE CLINIC | Facility: CLINIC | Age: 48
End: 2023-07-06

## 2023-07-06 NOTE — TELEPHONE ENCOUNTER
Caller: CARMEN    Relationship: Other    Best call back number: [phone]    What is the best time to reach you:BEFORE JULY 10 AT 1 PM    Who are you requesting to speak with (clinical staff, provider,  specific staff member): CLINICAL    Do you know the name of the person who called: CARMEN FROM [facility]    What was the call regarding: CARMEN IS REQUESTING A PEER TO PEER WITH DR SILVER.    PATIENT IS REQUESTING A PUSH RIM ACTIVATED SYSTEM FOR CURRENT WHEELCHAIR. CODE  CARMEN REQUESTED A CELL NUMBER OR A GOOD PHONE WITH A .

## 2023-07-23 DIAGNOSIS — F32.89 OTHER DEPRESSION: ICD-10-CM

## 2023-07-24 RX ORDER — VENLAFAXINE 75 MG/1
TABLET ORAL
Qty: 90 TABLET | Refills: 0 | Status: SHIPPED | OUTPATIENT
Start: 2023-07-24

## 2023-08-06 DIAGNOSIS — I10 PRIMARY HYPERTENSION: ICD-10-CM

## 2023-08-08 RX ORDER — DILTIAZEM HYDROCHLORIDE 120 MG/1
120 CAPSULE, EXTENDED RELEASE ORAL DAILY
Qty: 30 CAPSULE | Refills: 1 | Status: SHIPPED | OUTPATIENT
Start: 2023-08-08

## 2023-08-20 DIAGNOSIS — F32.89 OTHER DEPRESSION: ICD-10-CM

## 2023-08-21 RX ORDER — VENLAFAXINE 75 MG/1
TABLET ORAL
Qty: 60 TABLET | Refills: 0 | Status: SHIPPED | OUTPATIENT
Start: 2023-08-21

## 2023-09-18 DIAGNOSIS — I10 ESSENTIAL HYPERTENSION: ICD-10-CM

## 2023-09-18 RX ORDER — INDAPAMIDE 2.5 MG/1
TABLET ORAL
Qty: 90 TABLET | Refills: 1 | Status: SHIPPED | OUTPATIENT
Start: 2023-09-18

## 2023-09-28 ENCOUNTER — PROCEDURE VISIT (OUTPATIENT)
Dept: OBSTETRICS AND GYNECOLOGY | Facility: CLINIC | Age: 48
End: 2023-09-28
Payer: MEDICARE

## 2023-09-28 DIAGNOSIS — Z12.31 VISIT FOR SCREENING MAMMOGRAM: Primary | ICD-10-CM

## 2023-10-03 ENCOUNTER — OFFICE VISIT (OUTPATIENT)
Dept: OBSTETRICS AND GYNECOLOGY | Facility: CLINIC | Age: 48
End: 2023-10-03
Payer: MEDICARE

## 2023-10-03 VITALS — SYSTOLIC BLOOD PRESSURE: 118 MMHG | DIASTOLIC BLOOD PRESSURE: 88 MMHG | WEIGHT: 200 LBS | BODY MASS INDEX: 39.27 KG/M2

## 2023-10-03 DIAGNOSIS — Z01.419 ENCOUNTER FOR GYNECOLOGICAL EXAMINATION: Primary | ICD-10-CM

## 2023-10-03 DIAGNOSIS — Z30.42 ENCOUNTER FOR MANAGEMENT AND INJECTION OF DEPO-PROVERA: ICD-10-CM

## 2023-10-03 DIAGNOSIS — Z30.42 ENCOUNTER FOR SURVEILLANCE OF INJECTABLE CONTRACEPTIVE: ICD-10-CM

## 2023-10-03 RX ORDER — MEDROXYPROGESTERONE ACETATE 150 MG/ML
150 INJECTION, SUSPENSION INTRAMUSCULAR ONCE
Status: COMPLETED | OUTPATIENT
Start: 2023-10-03 | End: 2023-10-03

## 2023-10-03 RX ADMIN — MEDROXYPROGESTERONE ACETATE 150 MG: 150 INJECTION, SUSPENSION INTRAMUSCULAR at 15:49

## 2023-10-03 NOTE — PROGRESS NOTES
Pt presents for depo provera injection. LT D    Lot#:[id number]  EXP:3/31/27  NDC:79428-636-51       Pt tolerated injection

## 2023-10-03 NOTE — PROGRESS NOTES
"Chief Complaint  Annual Exam- Pap-2021 Mammo-2023  Cologuard-2023, negative    Subjective        Rehana Cruz presents to [facility]  History of Present Illness  Patient is a 47-year-old that presents for gynecological exam.  She has a history of spina bifida.  She self administers Depo-Provera every 12 weeks and is amenorrheic.  She is due today for Depo-Provera and would like to get it in our office.  She is currently up-to-date on her screening Pap smear, mammogram, and had a Cologuard this year that was negative.  She has no complaints.  Objective   Vital Signs:  /88   Wt 90.7 kg (200 lb)   BMI 39.27 kg/m²   Estimated body mass index is 39.27 kg/m² as calculated from the following:    Height as of 3/18/22: 152 cm (59.84\").    Weight as of this encounter: 90.7 kg (200 lb).               Physical Exam  Vitals reviewed. Exam conducted with a chaperone present.   Constitutional:       Appearance: She is well-developed.   Cardiovascular:      Rate and Rhythm: Normal rate and regular rhythm.   Pulmonary:      Effort: Pulmonary effort is normal.      Breath sounds: Normal breath sounds.   Chest:   Breasts:     Right: No inverted nipple, mass, nipple discharge, skin change or tenderness.      Left: No inverted nipple, mass, nipple discharge, skin change or tenderness.   Abdominal:      General: There is no distension.      Palpations: Abdomen is soft.      Tenderness: There is no abdominal tenderness.   Genitourinary:     Labia:         Right: No rash, tenderness, lesion or injury.         Left: No rash, tenderness, lesion or injury.       Urethra: No urethral lesion.      Vagina: Normal.      Cervix: Normal.      Uterus: Normal.       Adnexa:         Right: No mass, tenderness or fullness.          Left: No mass, tenderness or fullness.        Rectum: No external hemorrhoid.   Musculoskeletal:         General: Deformity present.   Neurological:      Mental Status: She is alert.    "   Result Review :                   Assessment and Plan   Diagnoses and all orders for this visit:    1. Encounter for gynecological examination (Primary)    2. Encounter for surveillance of injectable contraceptive    She was counseled on need for screening Pap smear in 1 year.  She currently does not need refills on her Depo-Provera and would like to continue on Depo-Provera at this time.  She was counseled on continuing with monthly self breast exams and yearly screening mammograms.         Follow Up   Return in about 1 year (around 10/3/2024) for gynecological exam.  Patient was given instructions and counseling regarding her condition or for health maintenance advice. Please see specific information pulled into the AVS if appropriate.

## 2023-10-07 DIAGNOSIS — I10 PRIMARY HYPERTENSION: ICD-10-CM

## 2023-10-09 RX ORDER — DILTIAZEM HYDROCHLORIDE 120 MG/1
120 CAPSULE, EXTENDED RELEASE ORAL DAILY
Qty: 30 CAPSULE | Refills: 0 | Status: SHIPPED | OUTPATIENT
Start: 2023-10-09

## 2023-10-30 DIAGNOSIS — F32.89 OTHER DEPRESSION: ICD-10-CM

## 2023-10-30 RX ORDER — VENLAFAXINE 75 MG/1
TABLET ORAL
Qty: 60 TABLET | Refills: 0 | Status: SHIPPED | OUTPATIENT
Start: 2023-10-30

## 2023-11-18 DIAGNOSIS — F32.89 OTHER DEPRESSION: ICD-10-CM

## 2023-11-20 RX ORDER — VENLAFAXINE 75 MG/1
TABLET ORAL
Qty: 60 TABLET | Refills: 0 | Status: SHIPPED | OUTPATIENT
Start: 2023-11-20

## 2023-11-24 DIAGNOSIS — I10 PRIMARY HYPERTENSION: ICD-10-CM

## 2023-11-27 RX ORDER — DILTIAZEM HYDROCHLORIDE 120 MG/1
120 CAPSULE, EXTENDED RELEASE ORAL DAILY
Qty: 15 CAPSULE | Refills: 0 | Status: SHIPPED | OUTPATIENT
Start: 2023-11-27

## 2023-12-07 DIAGNOSIS — F32.89 OTHER DEPRESSION: ICD-10-CM

## 2023-12-07 RX ORDER — VENLAFAXINE 75 MG/1
TABLET ORAL
Qty: 60 TABLET | Refills: 0 | Status: SHIPPED | OUTPATIENT
Start: 2023-12-07

## 2023-12-11 DIAGNOSIS — I10 PRIMARY HYPERTENSION: ICD-10-CM

## 2023-12-11 RX ORDER — DILTIAZEM HYDROCHLORIDE 120 MG/1
120 CAPSULE, EXTENDED RELEASE ORAL DAILY
Qty: 5 CAPSULE | Refills: 0 | Status: SHIPPED | OUTPATIENT
Start: 2023-12-11

## 2023-12-22 ENCOUNTER — OFFICE VISIT (OUTPATIENT)
Dept: FAMILY MEDICINE CLINIC | Facility: CLINIC | Age: 48
End: 2023-12-22
Payer: MEDICARE

## 2023-12-22 VITALS
OXYGEN SATURATION: 97 % | RESPIRATION RATE: 18 BRPM | SYSTOLIC BLOOD PRESSURE: 158 MMHG | DIASTOLIC BLOOD PRESSURE: 102 MMHG | HEART RATE: 117 BPM | BODY MASS INDEX: 40.4 KG/M2 | HEIGHT: 59 IN

## 2023-12-22 DIAGNOSIS — I10 PRIMARY HYPERTENSION: ICD-10-CM

## 2023-12-22 PROCEDURE — 1160F RVW MEDS BY RX/DR IN RCRD: CPT | Performed by: NURSE PRACTITIONER

## 2023-12-22 PROCEDURE — 1159F MED LIST DOCD IN RCRD: CPT | Performed by: NURSE PRACTITIONER

## 2023-12-22 PROCEDURE — 3080F DIAST BP >= 90 MM HG: CPT | Performed by: NURSE PRACTITIONER

## 2023-12-22 PROCEDURE — 99214 OFFICE O/P EST MOD 30 MIN: CPT | Performed by: NURSE PRACTITIONER

## 2023-12-22 PROCEDURE — 3077F SYST BP >= 140 MM HG: CPT | Performed by: NURSE PRACTITIONER

## 2023-12-22 RX ORDER — DILTIAZEM HYDROCHLORIDE 120 MG/1
120 CAPSULE, EXTENDED RELEASE ORAL DAILY
Qty: 90 CAPSULE | Refills: 0 | Status: SHIPPED | OUTPATIENT
Start: 2023-12-22

## 2023-12-22 NOTE — PROGRESS NOTES
Subjective   Rehana Cruz is a 48 y.o. female.     History of Present Illness   Patient presents for follow-up for hypertension, ongoing, chronic. Her blood pressure is elevated today, but she has been out of medication for 5 days. She denies any chest pain, shortness of breath, dizziness, palpitations or headaches.     The following portions of the patient's history were reviewed and updated as appropriate: allergies, current medications, past family history, past medical history, past social history, past surgical history and problem list.    Review of Systems   Constitutional:  Negative for chills, fatigue and fever.   Eyes:  Negative for blurred vision and double vision.   Respiratory:  Negative for cough, chest tightness, shortness of breath and wheezing.    Cardiovascular:  Negative for chest pain, palpitations and leg swelling.   Neurological:  Negative for dizziness, weakness and headache.       Objective   Physical Exam  Vitals and nursing note reviewed.   Constitutional:       Appearance: She is well-developed.   HENT:      Head: Normocephalic and atraumatic.   Eyes:      Conjunctiva/sclera: Conjunctivae normal.      Pupils: Pupils are equal, round, and reactive to light.   Neck:      Thyroid: No thyromegaly.   Cardiovascular:      Rate and Rhythm: Normal rate and regular rhythm.      Pulses: Normal pulses.      Heart sounds: Normal heart sounds. No murmur heard.     No friction rub. No gallop.   Pulmonary:      Effort: Pulmonary effort is normal.      Breath sounds: Normal breath sounds.   Musculoskeletal:      Cervical back: Normal range of motion and neck supple.   Lymphadenopathy:      Cervical: No cervical adenopathy.   Skin:     General: Skin is warm and dry.      Capillary Refill: Capillary refill takes 2 to 3 seconds.   Neurological:      Mental Status: She is alert and oriented to person, place, and time.      Cranial Nerves: No cranial nerve deficit.   Psychiatric:         Behavior: Behavior  normal.         Thought Content: Thought content normal.         Judgment: Judgment normal.         Vitals:    12/22/23 0933   BP: (!) 158/102   Pulse: 117   Resp: 18   SpO2: 97%     Body mass index is 40.4 kg/m².      Procedures    Assessment & Plan   Problems Addressed this Visit          Cardiac and Vasculature    Primary hypertension    Relevant Medications    dilTIAZem XR (DILACOR XR) 120 MG 24 hr capsule     Diagnoses         Codes Comments    Primary hypertension     ICD-10-CM: I10  ICD-9-CM: 401.9           Diltiazem  mg 24 HR         Return in about 6 months (around 6/22/2024) for Payal schedule nurse visit in 1 week to recheck BP, Recheck BP.

## 2023-12-22 NOTE — PATIENT INSTRUCTIONS
Return in about 6 months (around 6/22/2024) for Payal schedule nurse visit in 1 week to recheck BP, Recheck BP.

## 2023-12-27 ENCOUNTER — CLINICAL SUPPORT (OUTPATIENT)
Dept: FAMILY MEDICINE CLINIC | Facility: CLINIC | Age: 48
End: 2023-12-27
Payer: MEDICARE

## 2023-12-27 VITALS — DIASTOLIC BLOOD PRESSURE: 86 MMHG | SYSTOLIC BLOOD PRESSURE: 122 MMHG

## 2023-12-27 NOTE — PROGRESS NOTES
Patient present today for a blood pressure check at the request of Dr. De La Rosa due to a high BP reading at her most recent appointment. Performed BP reading with patient sitting on her right arm using an adult sized cuff. BP reading was 122/86 today, patient states this is normally where her blood pressure is while taking her medication.

## 2023-12-28 DIAGNOSIS — F32.89 OTHER DEPRESSION: ICD-10-CM

## 2023-12-28 RX ORDER — VENLAFAXINE 75 MG/1
TABLET ORAL
Qty: 60 TABLET | Refills: 0 | Status: SHIPPED | OUTPATIENT
Start: 2023-12-28

## 2024-01-17 DIAGNOSIS — F32.89 OTHER DEPRESSION: ICD-10-CM

## 2024-01-18 RX ORDER — VENLAFAXINE 75 MG/1
TABLET ORAL
Qty: 60 TABLET | Refills: 3 | Status: SHIPPED | OUTPATIENT
Start: 2024-01-18

## 2024-03-17 DIAGNOSIS — I10 ESSENTIAL HYPERTENSION: ICD-10-CM

## 2024-03-18 RX ORDER — INDAPAMIDE 2.5 MG/1
TABLET ORAL
Qty: 90 TABLET | Refills: 0 | Status: SHIPPED | OUTPATIENT
Start: 2024-03-18

## 2024-03-18 NOTE — TELEPHONE ENCOUNTER
LAST REFILL - 09/18/23  LAST VISIT - 03/28/23 with PCP; 12/22/23 with Helga Gutierrez  NEXT VISIT - not scheduled

## 2024-03-20 DIAGNOSIS — F32.89 OTHER DEPRESSION: ICD-10-CM

## 2024-03-20 RX ORDER — VENLAFAXINE 75 MG/1
TABLET ORAL
Qty: 60 TABLET | Refills: 3 | Status: SHIPPED | OUTPATIENT
Start: 2024-03-20

## 2024-03-22 DIAGNOSIS — I10 PRIMARY HYPERTENSION: ICD-10-CM

## 2024-03-22 RX ORDER — DILTIAZEM HYDROCHLORIDE 120 MG/1
120 CAPSULE, EXTENDED RELEASE ORAL DAILY
Qty: 90 CAPSULE | Refills: 0 | Status: SHIPPED | OUTPATIENT
Start: 2024-03-22

## 2024-03-22 NOTE — TELEPHONE ENCOUNTER
LAST REFILL - 12/22/23  LAST VISIT - 12/22/23 with Ivanna; 03/28/23 with PCP  NEXT VISIT - not scheduled

## 2024-03-29 DIAGNOSIS — I10 ESSENTIAL HYPERTENSION: ICD-10-CM

## 2024-03-29 RX ORDER — INDAPAMIDE 2.5 MG/1
2.5 TABLET ORAL EVERY MORNING
Qty: 90 TABLET | Refills: 0 | Status: SHIPPED | OUTPATIENT
Start: 2024-03-29

## 2024-03-29 RX ORDER — INDAPAMIDE 2.5 MG/1
TABLET ORAL
Qty: 90 TABLET | Refills: 0 | OUTPATIENT
Start: 2024-03-29

## 2024-03-29 NOTE — TELEPHONE ENCOUNTER
Caller: Rehana Cruz    Relationship: Self    Best call back number: [phone]    Requested Prescriptions:   Requested Prescriptions     Pending Prescriptions Disp Refills    indapamide (LOZOL) 2.5 MG tablet 90 tablet 0     Sig: Take 1 tablet by mouth Every Morning.        Pharmacy where request should be sent: [facility] #[id number] [facility] [address] AT [facility] [address]  [address]  [phone] [facility] [phone]      Last office visit with prescribing clinician: 3/28/2023   Last telemedicine visit with prescribing clinician: Visit date not found   Next office visit with prescribing clinician: Visit date not found     Additional details provided by patient: SHE DID NOT  THE LAST PRESCRIPTION.  SHE NEEDS TO GET THIS FILLED NOW BECAUSE SHE IS ALMOST OUT    Does the patient have less than a 3 day supply:  [x] Yes  [] No    Would you like a call back once the refill request has been completed: [] Yes [x] No    If the office needs to give you a call back, can they leave a voicemail: [] Yes [x] No    Nathaniel Buckner Rep   03/29/24 11:59 EDT

## 2024-03-29 NOTE — TELEPHONE ENCOUNTER
Contacted pharmacy to confirm receipt of medication refill. Pharmacy unable to verify this, stating last refill on file is from December 2023. Refill sent again. Patient notified.

## 2024-04-12 DIAGNOSIS — F32.89 OTHER DEPRESSION: ICD-10-CM

## 2024-04-12 RX ORDER — VENLAFAXINE 75 MG/1
TABLET ORAL
Qty: 60 TABLET | Refills: 3 | Status: SHIPPED | OUTPATIENT
Start: 2024-04-12

## 2024-06-21 ENCOUNTER — TELEPHONE (OUTPATIENT)
Dept: FAMILY MEDICINE CLINIC | Facility: CLINIC | Age: 49
End: 2024-06-21
Payer: MEDICARE

## 2024-06-21 NOTE — TELEPHONE ENCOUNTER
Caller: Rehana Cruz    Relationship to patient: Self    Best call back number: [phone]     Date of positive COVID19 test: HOME COVID TEST / 06/21/24 / SYMPTOMS STARTED ON 06/19/24    Date of possible COVID19 exposure: UNKNOWN    COVID19 symptoms: BODY ACHES / FATIGUE / HEADACHE / SORE THROAT     Date of initial quarantine:     Additional information or concerns: PATIENT CALLING WANTING TO KNOW WHAT SHE SHOULD BE EATING AND TAKING FOR SYMPTOMS. SHE STATED THAT SHE USED A TEST FROM 2022.    What is the patients preferred pharmacy: [facility] #[id number] [facility] [address] AT [address] - [phone]  - [phone]  [phone]

## 2024-06-24 DIAGNOSIS — I10 ESSENTIAL HYPERTENSION: ICD-10-CM

## 2024-06-24 DIAGNOSIS — F32.89 OTHER DEPRESSION: ICD-10-CM

## 2024-06-24 RX ORDER — VENLAFAXINE 75 MG/1
TABLET ORAL
Qty: 60 TABLET | Refills: 1 | Status: SHIPPED | OUTPATIENT
Start: 2024-06-24

## 2024-06-24 RX ORDER — INDAPAMIDE 2.5 MG/1
2.5 TABLET ORAL EVERY MORNING
Qty: 90 TABLET | Refills: 0 | Status: SHIPPED | OUTPATIENT
Start: 2024-06-24

## 2024-06-26 DIAGNOSIS — I10 PRIMARY HYPERTENSION: ICD-10-CM

## 2024-06-26 RX ORDER — DILTIAZEM HYDROCHLORIDE 120 MG/1
120 CAPSULE, EXTENDED RELEASE ORAL DAILY
Qty: 5 CAPSULE | Refills: 0 | Status: SHIPPED | OUTPATIENT
Start: 2024-06-26

## 2024-07-11 DIAGNOSIS — I10 PRIMARY HYPERTENSION: ICD-10-CM

## 2024-07-11 RX ORDER — DILTIAZEM HYDROCHLORIDE 120 MG/1
120 CAPSULE, EXTENDED RELEASE ORAL DAILY
Qty: 5 CAPSULE | Refills: 0 | OUTPATIENT
Start: 2024-07-11

## 2024-07-18 DIAGNOSIS — I10 PRIMARY HYPERTENSION: ICD-10-CM

## 2024-07-18 DIAGNOSIS — I10 ESSENTIAL HYPERTENSION: ICD-10-CM

## 2024-07-18 RX ORDER — INDAPAMIDE 2.5 MG/1
2.5 TABLET ORAL EVERY MORNING
Qty: 90 TABLET | Refills: 0 | Status: SHIPPED | OUTPATIENT
Start: 2024-07-18

## 2024-07-18 RX ORDER — DILTIAZEM HYDROCHLORIDE 120 MG/1
120 CAPSULE, EXTENDED RELEASE ORAL DAILY
Qty: 90 CAPSULE | Refills: 0 | Status: SHIPPED | OUTPATIENT
Start: 2024-07-18

## 2024-07-18 NOTE — TELEPHONE ENCOUNTER
LAST REFILL - 06/24/24  LAST VISIT - 03/28/23 with PCP, most recent 12/22/23 with Ivanna Gutierrez  NEXT VISIT - 11/01/24     Pended 90 day supply, please advise if appropriate.

## 2024-07-18 NOTE — TELEPHONE ENCOUNTER
Caller: Rehana Cruz    Relationship: Self    Best call back number: [phone]     Requested Prescriptions:   Requested Prescriptions     Pending Prescriptions Disp Refills    dilTIAZem XR (DILACOR XR) 120 MG 24 hr capsule 5 capsule 0     Sig: Take 1 capsule by mouth Daily.    indapamide (LOZOL) 2.5 MG tablet 90 tablet 0     Sig: Take 1 tablet by mouth Every Morning.        Pharmacy where request should be sent: [facility] #[id number] [place] [address] AT [facility] [address] - [phone] [facility] [phone] FX     Last office visit with prescribing clinician: 3/28/2023   Last telemedicine visit with prescribing clinician: Visit date not found   Next office visit with prescribing clinician: 11/1/2024     Additional details provided by patient: PATIENT IS OUT OF DILTIAZEM     Does the patient have less than a 3 day supply:  [x] Yes  [] No    Would you like a call back once the refill request has been completed: [] Yes [x] No    Nathaniel Villalobos Rep   07/18/24 11:46 EDT

## 2024-08-04 DIAGNOSIS — F32.89 OTHER DEPRESSION: ICD-10-CM

## 2024-08-05 RX ORDER — VENLAFAXINE 75 MG/1
TABLET ORAL
Qty: 60 TABLET | Refills: 1 | Status: SHIPPED | OUTPATIENT
Start: 2024-08-05

## 2024-08-22 DIAGNOSIS — F32.89 OTHER DEPRESSION: ICD-10-CM

## 2024-08-22 RX ORDER — VENLAFAXINE 75 MG/1
TABLET ORAL
Qty: 60 TABLET | Refills: 1 | Status: SHIPPED | OUTPATIENT
Start: 2024-08-22

## 2024-09-30 DIAGNOSIS — F32.89 OTHER DEPRESSION: ICD-10-CM

## 2024-09-30 RX ORDER — VENLAFAXINE 75 MG/1
TABLET ORAL
Qty: 60 TABLET | Refills: 1 | Status: SHIPPED | OUTPATIENT
Start: 2024-09-30

## 2024-10-03 DIAGNOSIS — I10 HYPERTENSION, ESSENTIAL: ICD-10-CM

## 2024-10-03 RX ORDER — AMLODIPINE BESYLATE 5 MG/1
TABLET ORAL
Qty: 90 TABLET | Refills: 0 | OUTPATIENT
Start: 2024-10-03

## 2024-10-04 ENCOUNTER — TELEPHONE (OUTPATIENT)
Dept: FAMILY MEDICINE CLINIC | Facility: CLINIC | Age: 49
End: 2024-10-04
Payer: MEDICARE

## 2024-10-04 DIAGNOSIS — I10 PRIMARY HYPERTENSION: ICD-10-CM

## 2024-10-04 RX ORDER — DILTIAZEM HYDROCHLORIDE 120 MG/1
120 CAPSULE, EXTENDED RELEASE ORAL DAILY
Qty: 15 CAPSULE | Refills: 0 | Status: SHIPPED | OUTPATIENT
Start: 2024-10-04

## 2024-10-04 NOTE — TELEPHONE ENCOUNTER
Patient was requesting incorrect medication - can we refill  dilTIAZem XR (DILACOR XR) 120 MG 24 hr capsule [

## 2024-10-04 NOTE — TELEPHONE ENCOUNTER
Patient scheduled a [employer] wellness exam for November with Dr. Burnett.  Is it possible to fill her : amLODIPine (NORVASC) 5 MG tablet?  Not sure why it doesn't show on current med list.  Patient states she has been taking it.

## 2024-10-18 ENCOUNTER — OFFICE VISIT (OUTPATIENT)
Dept: FAMILY MEDICINE CLINIC | Facility: CLINIC | Age: 49
End: 2024-10-18
Payer: MEDICARE

## 2024-10-18 VITALS
DIASTOLIC BLOOD PRESSURE: 70 MMHG | OXYGEN SATURATION: 98 % | HEART RATE: 70 BPM | RESPIRATION RATE: 20 BRPM | BODY MASS INDEX: 40.32 KG/M2 | HEIGHT: 59 IN | WEIGHT: 200 LBS | SYSTOLIC BLOOD PRESSURE: 138 MMHG

## 2024-10-18 DIAGNOSIS — E66.01 MORBIDLY OBESE: ICD-10-CM

## 2024-10-18 DIAGNOSIS — I10 PRIMARY HYPERTENSION: Primary | ICD-10-CM

## 2024-10-18 DIAGNOSIS — E83.110 HEREDITARY HEMOCHROMATOSIS: ICD-10-CM

## 2024-10-18 DIAGNOSIS — N31.9 NEUROGENIC BLADDER: ICD-10-CM

## 2024-10-18 DIAGNOSIS — E87.6 HYPOKALEMIA: ICD-10-CM

## 2024-10-18 DIAGNOSIS — Z13.89 ENCOUNTER FOR SCREENING FOR OTHER DISORDER: ICD-10-CM

## 2024-10-18 DIAGNOSIS — F32.89 OTHER DEPRESSION: ICD-10-CM

## 2024-10-18 DIAGNOSIS — Z74.09 MUSCULOSKELETAL IMMOBILITY: ICD-10-CM

## 2024-10-18 DIAGNOSIS — Q05.7 SPINA BIFIDA OF LUMBOSACRAL REGION WITHOUT HYDROCEPHALUS: ICD-10-CM

## 2024-10-18 RX ORDER — VENLAFAXINE 75 MG/1
150 TABLET ORAL 2 TIMES DAILY
Qty: 180 TABLET | Refills: 3 | Status: SHIPPED | OUTPATIENT
Start: 2024-10-18

## 2024-10-18 RX ORDER — DILTIAZEM HYDROCHLORIDE 120 MG/1
120 CAPSULE, EXTENDED RELEASE ORAL DAILY
Qty: 90 CAPSULE | Refills: 3 | Status: SHIPPED | OUTPATIENT
Start: 2024-10-18

## 2024-10-18 RX ORDER — INDAPAMIDE 2.5 MG/1
2.5 TABLET ORAL EVERY MORNING
Qty: 90 TABLET | Refills: 3 | Status: SHIPPED | OUTPATIENT
Start: 2024-10-18

## 2024-10-18 NOTE — PATIENT INSTRUCTIONS
"Mediterranean diet: A heart-healthy eating plan  The heart-healthy Mediterranean diet is a healthy eating plan based on typical foods and recipes of Mediterranean-style cooking. Here's how to adopt the Mediterranean diet.  By [facility] Staff  If you're looking for a heart-healthy eating plan, the Mediterranean diet might be right for you.    The Mediterranean diet incorporates the basics of healthy eating -- plus a splash of flavorful olive oil and perhaps a glass of red wine -- among other components characterizing the traditional cooking style of countries bordering the [place].Most healthy diets include fruits, vegetables, fish and whole grains, and limit unhealthy fats. While these parts of a healthy diet are tried-and-true, subtle variations or differences in proportions of certain foods may make a difference in your risk of heart disease.    Benefits of the Mediterranean diet  Research has shown that the traditional Mediterranean diet reduces the risk of heart disease. The diet has been associated with a lower level of oxidized low-density lipoprotein (LDL) cholesterol -- the \"bad\" cholesterol that's more likely to build up deposits in your arteries. In fact, a meta-analysis of more than 1.5 million healthy adults demonstrated that following a Mediterranean diet was associated with a reduced risk of cardiovascular mortality as well as overall mortality.    The Mediterranean diet is also associated with a reduced incidence of cancer, and Parkinson's and Alzheimer's diseases. Women who eat a Mediterranean diet supplemented with extra-virgin olive oil and mixed nuts may have a reduced risk of breast cancer.For these reasons, most if not all major scientific organizations encourage healthy adults to adapt a style of eating like that of the Mediterranean diet for prevention of major chronic diseases.    Key components of the Mediterranean diet    The Mediterranean diet emphasizes:     Eating primarily " "plant-based foods, such as fruits and vegetables, whole grains, legumes and nuts   Replacing butter with healthy fats such as olive oil and canola oil   Using herbs and spices instead of salt to flavor foods   Limiting red meat to no more than a few times a month   Eating fish and poultry at least twice a week   Enjoying meals with family and friends   Drinking red wine in moderation (optional)   Getting plenty of exercise   Fruits, vegetables, nuts and grains    The Mediterranean diet traditionally includes fruits, vegetables, pasta and rice. For example, residents of [facility] eat very little red meat and average nine servings a day of antioxidant-rich fruits and vegetables.  Grains in the Mediterranean region are typically whole grain and usually contain very few unhealthy trans fats, and bread is an important part of the diet there. However, throughout the Mediterranean region, bread is eaten plain or dipped in olive oil -- not eaten with butter or margarines, which contain saturated or trans fats.  Nuts are another part of a healthy Mediterranean diet. Nuts are high in fat (approximately 80 percent of their calories come from fat), but most of the fat is not saturated. Because nuts are high in calories, they should not be eaten in large amounts -- generally no more than a handful a day. Avoid candied or honey-roasted and heavily salted nuts.    Healthy fats  The focus of the Mediterranean diet isn't on limiting total fat consumption, but rather to make muñoz choices about the types of fat you eat. The Mediterranean diet discourages saturated fats and hydrogenated oils (trans fats), both of which contribute to heart disease.The Mediterranean diet features olive oil as the primary source of fat. Olive oil provides monounsaturated fat -- a type of fat that can help reduce LDL cholesterol levels when used in place of saturated or trans fats.\"Extra-virgin\" and \"virgin\" olive oils -- the least processed forms -- also " contain the highest levels of the protective plant compounds that provide antioxidant effects.    Monounsaturated fats and polyunsaturated fats, such as canola oil and some nuts, contain the beneficial linolenic acid (a type of omega-3 fatty acid). Omega-3 fatty acids lower triglycerides, decrease blood clotting, are associated with decreased sudden heart attack, improve the health of your blood vessels, and help moderate blood pressure. Fatty fish -- such as mackerel, lake trout, herring, sardines, albacore tuna and salmon -- are rich sources of omega-3 fatty acids. Fish is eaten on a regular basis in the Mediterranean diet.    Wine    The health effects of alcohol have been debated for many years, and some doctors are reluctant to encourage alcohol consumption because of the health consequences of excessive drinking.However, alcohol -- in moderation -- has been associated with a reduced risk of heart disease in some research studies.    The Mediterranean diet typically includes a moderate amount of wine. This means no more than 5 ounces (148 milliliters) of wine daily for women (or men over age 65), and no more than 10 ounces (296 milliliters) of wine daily for men under age 65. If you're unable to limit your alcohol intake to the amounts defined above, if you have a personal or family history of alcohol abuse, or if you have heart or liver disease, refrain from drinking wine or any other alcohol.    Putting it all together    The Mediterranean diet is a delicious and healthy way to eat. Many people who switch to this style of eating say they'll never eat any other way. Here are some specific steps to get you started:    Eat your veggies and fruits -- and switch to whole grains. An abundance and variety of plant foods should make up the majority of your meals. Strive for seven to 10 servings a day of veggies and fruits. Switch to whole-grain bread and cereal, and begin to eat more whole-gain rice and pasta  products.  Go nuts. Keep almonds, cashews, pistachios and walnuts on hand for a quick snack. Choose natural peanut butter, rather than the kind with hydrogenated fat added. Try tahini (blended sesame seeds) as a dip or spread for bread.  Pass on the butter. Try olive or canola oil as a healthy replacement for butter or margarine. Use it in cooking. Dip bread in flavored olive oil or lightly spread it on whole-grain bread for a tasty alternative to butter. Or try tahini as a dip or spread.  Spice it up. Herbs and spices make food tasty and are also rich in health-promoting substances. Season your meals with herbs and spices rather than salt.  Go fish. Eat fish once or twice a week. Fresh or water-packed tuna, salmon, trout, mackerel and herring are healthy choices. Grilled fish tastes good and requires little cleanup. Avoid fried fish, unless it's sauteed in a small amount of canola oil.  Rein in the red meat. Substitute fish and poultry for red meat. When eaten, make sure it's lean and keep portions small (about the size of a deck of cards). Also avoid sausage, neumann and other high-fat meats.  Choose low-fat dairy. Limit higher fat dairy products such as whole or 2 percent milk, cheese and ice cream. Switch to skim milk, fat-free yogurt and low-fat cheese.  Raise a glass to healthy eating. If it's OK with your doctor, have a glass of wine at dinner. If you don't drink alcohol, you don't need to start. Drinking purple grape juice may be an alternative to wine.

## 2024-10-18 NOTE — PROGRESS NOTES
"Subjective   The ABCs of the Annual Wellness Visit  Medicare Wellness Visit      Rehana Cruz is a 48 y.o. patient who presents for a Medicare Wellness Visit.  Compared to one year ago, the patient feels her physical health is the same and her mental health is better.    Recent Hospitalizations:  She was not admitted to the hospital during the last year.     Current Medical Providers:  Patient Care Team:  Rogelio De La Rosa MD as PCP - General    No opioid medication identified on active medication list. I have reviewed chart for other potential  high risk medication/s and harmful drug interactions in the elderly.  Aspirin is not on active medication list.  Aspirin use is not indicated based on review of current medical condition/s. Risk of harm outweighs potential benefits.  .  Advance Care Planning Advance Directive is not on file.  ACP discussion was held with the patient during this visit. Patient has an advance directive (not in EMR), copy requested.      Objective   Vitals:    10/18/24 1327   BP: 138/70   Pulse: 70   Resp: 20   SpO2: 98%   Weight: 90.7 kg (200 lb)   Height: 149.9 cm (59\")   PainSc:   2       Estimated body mass index is 40.4 kg/m² as calculated from the following:    Height as of this encounter: 149.9 cm (59\").    Weight as of this encounter: 90.7 kg (200 lb).    Class 3 Severe Obesity (BMI >=40). Obesity-related health conditions include the following: hypertension, impaired fasting glucose, and dyslipidemias. Obesity is unchanged. BMI is is above average; BMI management plan is completed. We discussed Information on healthy weight added to patient's after visit summary.       Does the patient have evidence of cognitive impairment? No                                                                                                Health  Risk Assessment    Smoking Status:  Social History     Tobacco Use   Smoking Status Some Days    Current packs/day: 0.50    Average packs/day: 0.5 " packs/day for 20.0 years (10.0 ttl pk-yrs)    Types: Cigarettes   Smokeless Tobacco Never     Alcohol Consumption:  Social History     Substance and Sexual Activity   Alcohol Use Yes    Alcohol/week: 4.0 standard drinks of alcohol    Types: 2 Glasses of wine, 2 Drinks containing 0.5 oz of alcohol per week    Comment: 3/mo       Fall Risk Navigator Hyper[employer]  SARAHY Fall Risk Assessment was completed, and patient is at LOW risk for falls.Assessment completed on:10/18/2024    Depression Screening:      10/18/2024     1:27 PM   PHQ-2/PHQ-9 Depression Screening   Little interest or pleasure in doing things Not at all   Feeling down, depressed, or hopeless Not at all     Health Habits and Functional and Cognitive Screening:      10/18/2024     1:29 PM   Functional & Cognitive Status   Do you have difficulty preparing food and eating? No   Do you have difficulty using the toilet? No   Do you have difficulty moving around from place to place? No   Do you have trouble with steps or getting out of a bed or a chair? No   Current Diet Well Balanced Diet   Dental Exam Up to date   Eye Exam Up to date   Exercise (times per week) 0 times per week   Current Exercises Include No Regular Exercise   Do you need help using the phone?  No   Are you deaf or do you have serious difficulty hearing?  No   Do you need help to go to places out of walking distance? No   Do you need help shopping? No   Do you need help preparing meals?  No   Do you need help with housework?  No   Do you need help with laundry? No   Do you need help taking your medications? No   Do you need help managing money? No   Do you ever drive or ride in a car without wearing a seat belt? No   Have you felt unusual stress, anger or loneliness in the last month? No   Who do you live with? Alone   If you need help, do you have trouble finding someone available to you? No   Have you been bothered in the last four weeks by sexual problems? No   Do you have difficulty  concentrating, remembering or making decisions? No             Age-appropriate Screening Schedule:  Orders for future screening recommendations based on patient's age, sex and/or medical conditions are listed in the plan section. The patient has been provided with a written plan.  ___________________________________________                                                                                                                                           CMS Preventative Services Quick Reference  Risk Factors Identified During Encounter  None Identified  Tobacco Use/Dependance Risk Patient only smokes on occasional days  The above risks/problems have been discussed with the patient.  Pertinent information has been shared with the patient in the After Visit Summary.  An After Visit Summary and PPPS were made available to the patient.    Follow Up:   Next Medicare Wellness visit to be scheduled in 1 year.       Additional E&M Note during same encounter follows:  Patient has additional, significant, and separately identifiable condition(s)/problem(s) that require work above and beyond the Medicare Wellness Visit          Assessment & Plan  1. HTN  Her blood pressure readings are within the normal range.     2. Neurogenic Bladder.  From Spina Bifida.  She has tried various treatments including Botox and medications without significant improvement. She is interested in seeing a specialist at [place] who has more experience with neurogenic bladder. She is interested in obtaining a standing frame to help with osteoporosis and circulation. A referral to a physical therapist will be provided to support this request.     3. Hemochromatosis.  She monitors her serum ferritin levels annually to manage her iron levels. A serum ferritin test will be ordered to keep her iron levels in check. She also donates blood regularly to help manage her condition.    4. Medication Management.  She requested refills for her current  medications. These will be processed as needed.      Orders Placed This Encounter   Procedures    CBC (No Diff)    Comprehensive Metabolic Panel    Lipid Panel With LDL / HDL Ratio    Ferritin    Ambulatory Referral to Physical Therapy for Evaluation & Treatment    Ambulatory Referral to Urology     New Medications Ordered This Visit   Medications    dilTIAZem XR (DILACOR XR) 120 MG 24 hr capsule     Sig: Take 1 capsule by mouth Daily.     Dispense:  90 capsule     Refill:  3    indapamide (LOZOL) 2.5 MG tablet     Sig: Take 1 tablet by mouth Every Morning.     Dispense:  90 tablet     Refill:  3    venlafaxine (EFFEXOR) 75 MG tablet     Sig: Take 2 tablets by mouth 2 (Two) Times a Day.     Dispense:  180 tablet     Refill:  3        Other depression  Controlled on effexor  Musculoskeletal immobility  PT referral  Hypokalemia  Labs today            Rehana is a 48 y.o. being seen today for  HTN, hemochromatosis, neurogenic bladder   HISTORY    HPI     The patient is a 48-year-old female who presents for evaluation of multiple medical concerns.    She reports an improvement in her mental health compared to the previous year, with no hospital admissions. She does not have a living will. Her blood pressure reading was 130/78. She is seeking refills for her medications.    She has been diagnosed with spina bifida. She is considering the use of a standing frame, which she believes could be beneficial to avoid osteoporosis and overall health. She needs physical therapy to certify that she would be helped by a standing frame. 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    She is under the care of a urology company for neurogenic bladder but feels they have not been helpful. She has tried Botox and various medications, as well as considered electrical stimulation, which was not recommended for her. She is considering seeing a doctor at [place], as suggested by a  friend with spina bifida, and is requesting a referral to this doctor. She self-catheterizes every 3 to 4 hours and experiences significant leakage issues at night.    She undergoes annual blood work and serum ferritin level tests due to her hemochromatosis. Her hematologist advised her to keep her ferritin level below 200. She donates blood regularly and has made dietary changes to manage her condition.    She believes she is entering perimenopause and has noticed changes in her bowel function.    Social History  She  reports that she has been smoking cigarettes. She has a 10 pack-year smoking history. She has never used smokeless tobacco. She reports current alcohol use of about 4.0 standard drinks of alcohol per week. She reports that she does not use drugs.  EXAM DATA    Vital Signs        BP Readings from Last 1 Encounters:   10/18/24 138/70     Wt Readings from Last 3 Encounters:   10/18/24 90.7 kg (200 lb)   10/03/23 90.7 kg (200 lb)   06/15/22 95.3 kg (210 lb)   Body mass index is 40.4 kg/m².  Physical Exam  Vitals reviewed.   Constitutional:       Appearance: Normal appearance. She is well-developed. She is obese.      Comments: In a wheelchair   Cardiovascular:      Rate and Rhythm: Normal rate and regular rhythm.      Heart sounds: Normal heart sounds.   Pulmonary:      Effort: Pulmonary effort is normal.      Breath sounds: Normal breath sounds.   Neurological:      Mental Status: She is alert.   Psychiatric:         Behavior: Behavior normal.         Thought Content: Thought content normal.         Judgment: Judgment normal.             Patient or patient representative verbalized consent for the use of Ambient Listening during the visit with  Elizabeth Burnett MD for chart documentation. 10/18/2024  13:40 EDT

## 2024-10-19 LAB
ALBUMIN SERPL-MCNC: 4.2 G/DL (ref 3.5–5.2)
ALBUMIN/GLOB SERPL: 1.4 G/DL
ALP SERPL-CCNC: 115 U/L (ref 39–117)
ALT SERPL-CCNC: 22 U/L (ref 1–33)
AST SERPL-CCNC: 18 U/L (ref 1–32)
BILIRUB SERPL-MCNC: 0.2 MG/DL (ref 0–1.2)
BUN SERPL-MCNC: 14 MG/DL (ref 6–20)
BUN/CREAT SERPL: 25 (ref 7–25)
CALCIUM SERPL-MCNC: 10.4 MG/DL (ref 8.6–10.5)
CHLORIDE SERPL-SCNC: 97 MMOL/L (ref 98–107)
CHOLEST SERPL-MCNC: 263 MG/DL (ref 0–200)
CO2 SERPL-SCNC: 27.8 MMOL/L (ref 22–29)
CREAT SERPL-MCNC: 0.56 MG/DL (ref 0.57–1)
EGFRCR SERPLBLD CKD-EPI 2021: 113.4 ML/MIN/1.73
ERYTHROCYTE [DISTWIDTH] IN BLOOD BY AUTOMATED COUNT: 11.9 % (ref 12.3–15.4)
FERRITIN SERPL-MCNC: 137 NG/ML (ref 13–150)
GLOBULIN SER CALC-MCNC: 2.9 GM/DL
GLUCOSE SERPL-MCNC: 111 MG/DL (ref 65–99)
HCT VFR BLD AUTO: 48.5 % (ref 34–46.6)
HDLC SERPL-MCNC: 46 MG/DL (ref 40–60)
HGB BLD-MCNC: 17.3 G/DL (ref 12–15.9)
LDLC SERPL CALC-MCNC: 184 MG/DL (ref 0–100)
LDLC/HDLC SERPL: 3.94 {RATIO}
MCH RBC QN AUTO: 33.8 PG (ref 26.6–33)
MCHC RBC AUTO-ENTMCNC: 35.7 G/DL (ref 31.5–35.7)
MCV RBC AUTO: 94.7 FL (ref 79–97)
PLATELET # BLD AUTO: 353 10*3/MM3 (ref 140–450)
POTASSIUM SERPL-SCNC: 3 MMOL/L (ref 3.5–5.2)
PROT SERPL-MCNC: 7.1 G/DL (ref 6–8.5)
RBC # BLD AUTO: 5.12 10*6/MM3 (ref 3.77–5.28)
SODIUM SERPL-SCNC: 137 MMOL/L (ref 136–145)
TRIGL SERPL-MCNC: 179 MG/DL (ref 0–150)
VLDLC SERPL CALC-MCNC: 33 MG/DL (ref 5–40)
WBC # BLD AUTO: 11.13 10*3/MM3 (ref 3.4–10.8)

## 2024-10-29 ENCOUNTER — TELEPHONE (OUTPATIENT)
Dept: FAMILY MEDICINE CLINIC | Facility: CLINIC | Age: 49
End: 2024-10-29
Payer: MEDICARE

## 2024-11-04 DIAGNOSIS — E78.2 MIXED HYPERLIPIDEMIA: Primary | ICD-10-CM

## 2024-11-04 RX ORDER — ROSUVASTATIN CALCIUM 10 MG/1
10 TABLET, COATED ORAL DAILY
Qty: 90 TABLET | Refills: 0 | Status: SHIPPED | OUTPATIENT
Start: 2024-11-04

## 2025-02-21 ENCOUNTER — TELEPHONE (OUTPATIENT)
Dept: FAMILY MEDICINE CLINIC | Facility: CLINIC | Age: 50
End: 2025-02-21
Payer: MEDICARE

## 2025-02-21 DIAGNOSIS — I10 PRIMARY HYPERTENSION: ICD-10-CM

## 2025-02-21 NOTE — TELEPHONE ENCOUNTER
Patient received indapamide (Lozol) and diltiazem (Dilacor) refills on 10/18/24 for 90 day supply with 3 refills - should not need these refilled.    Venlafaxine (Effexor) refill sent with instructions to take 2 tab BID, 180 quantity and 3 refills. This is calculated to be a 45 day supply with 3 refills.    Will contact the pharmacy to verify if refills are available on file before contacting patient. Pharmacy is closed for lunch at time of documentation.

## 2025-02-21 NOTE — TELEPHONE ENCOUNTER
Patient has an upcoming appointment with a new PCP, but is out of Dilacor, Lozol and Effexor and is asking to have refills called to [facility] [phone]

## 2025-02-21 NOTE — TELEPHONE ENCOUNTER
"Contacted [facility] at [phone] to request refill information on patient's requested refills. Spoke with pharmacy tech who stated those medications were \"closed out\" from their pharmacy, unable to state reason why, but suggested this could have already been transferred as requested by the patient. Attempted to contact the [facility] pharmacy at [phone], was unable to speak with any pharmacy tech to verify.    LVM for patient to return call to inquire about possible transfer from [facility] to [facility].  "

## 2025-02-27 ENCOUNTER — OFFICE VISIT (OUTPATIENT)
Dept: INTERNAL MEDICINE | Facility: CLINIC | Age: 50
End: 2025-02-27
Payer: MEDICARE

## 2025-02-27 VITALS
TEMPERATURE: 97.9 F | OXYGEN SATURATION: 96 % | BODY MASS INDEX: 39.27 KG/M2 | WEIGHT: 200 LBS | DIASTOLIC BLOOD PRESSURE: 92 MMHG | SYSTOLIC BLOOD PRESSURE: 130 MMHG | HEART RATE: 98 BPM | HEIGHT: 60 IN

## 2025-02-27 DIAGNOSIS — F32.89 OTHER DEPRESSION: Chronic | ICD-10-CM

## 2025-02-27 DIAGNOSIS — S91.301A WOUND OF RIGHT FOOT: ICD-10-CM

## 2025-02-27 DIAGNOSIS — N31.9 NEUROGENIC BLADDER: Chronic | ICD-10-CM

## 2025-02-27 DIAGNOSIS — Z76.89 ENCOUNTER TO ESTABLISH CARE: Primary | ICD-10-CM

## 2025-02-27 DIAGNOSIS — I10 PRIMARY HYPERTENSION: Chronic | ICD-10-CM

## 2025-02-27 DIAGNOSIS — E83.110 HEREDITARY HEMOCHROMATOSIS: Chronic | ICD-10-CM

## 2025-02-27 DIAGNOSIS — Q05.7 SPINA BIFIDA OF LUMBOSACRAL REGION WITHOUT HYDROCEPHALUS: Chronic | ICD-10-CM

## 2025-02-27 DIAGNOSIS — Z53.20 STATIN DECLINED: Chronic | ICD-10-CM

## 2025-02-27 DIAGNOSIS — K59.2 NEUROGENIC BOWEL: Chronic | ICD-10-CM

## 2025-02-27 DIAGNOSIS — E78.2 MIXED HYPERLIPIDEMIA: Chronic | ICD-10-CM

## 2025-02-27 LAB
ALBUMIN SERPL-MCNC: 4 G/DL (ref 3.5–5.2)
ALBUMIN/GLOB SERPL: 1.5 G/DL
ALP SERPL-CCNC: 106 U/L (ref 39–117)
ALT SERPL-CCNC: 18 U/L (ref 1–33)
AST SERPL-CCNC: 20 U/L (ref 1–32)
BASOPHILS # BLD AUTO: 0.05 10*3/MM3 (ref 0–0.2)
BASOPHILS NFR BLD AUTO: 0.5 % (ref 0–1.5)
BILIRUB SERPL-MCNC: 0.2 MG/DL (ref 0–1.2)
BUN SERPL-MCNC: 15 MG/DL (ref 6–20)
BUN/CREAT SERPL: 30 (ref 7–25)
CALCIUM SERPL-MCNC: 9.6 MG/DL (ref 8.6–10.5)
CHLORIDE SERPL-SCNC: 101 MMOL/L (ref 98–107)
CHOLEST SERPL-MCNC: 247 MG/DL (ref 0–200)
CO2 SERPL-SCNC: 28.5 MMOL/L (ref 22–29)
CREAT SERPL-MCNC: 0.5 MG/DL (ref 0.57–1)
EGFRCR SERPLBLD CKD-EPI 2021: 115.9 ML/MIN/1.73
EOSINOPHIL # BLD AUTO: 0.1 10*3/MM3 (ref 0–0.4)
EOSINOPHIL NFR BLD AUTO: 1 % (ref 0.3–6.2)
ERYTHROCYTE [DISTWIDTH] IN BLOOD BY AUTOMATED COUNT: 11.3 % (ref 12.3–15.4)
FERRITIN SERPL-MCNC: 152 NG/ML (ref 13–150)
GLOBULIN SER CALC-MCNC: 2.6 GM/DL
GLUCOSE SERPL-MCNC: 108 MG/DL (ref 65–99)
HCT VFR BLD AUTO: 45.4 % (ref 34–46.6)
HDLC SERPL-MCNC: 40 MG/DL (ref 40–60)
HGB BLD-MCNC: 15.7 G/DL (ref 12–15.9)
IMM GRANULOCYTES # BLD AUTO: 0.05 10*3/MM3 (ref 0–0.05)
IMM GRANULOCYTES NFR BLD AUTO: 0.5 % (ref 0–0.5)
IRON SATN MFR SERPL: 46 % (ref 20–50)
IRON SERPL-MCNC: 148 MCG/DL (ref 37–145)
LDLC SERPL CALC-MCNC: 156 MG/DL (ref 0–100)
LYMPHOCYTES # BLD AUTO: 2.3 10*3/MM3 (ref 0.7–3.1)
LYMPHOCYTES NFR BLD AUTO: 23.2 % (ref 19.6–45.3)
MCH RBC QN AUTO: 32.2 PG (ref 26.6–33)
MCHC RBC AUTO-ENTMCNC: 34.6 G/DL (ref 31.5–35.7)
MCV RBC AUTO: 93 FL (ref 79–97)
MONOCYTES # BLD AUTO: 0.53 10*3/MM3 (ref 0.1–0.9)
MONOCYTES NFR BLD AUTO: 5.4 % (ref 5–12)
NEUTROPHILS # BLD AUTO: 6.87 10*3/MM3 (ref 1.7–7)
NEUTROPHILS NFR BLD AUTO: 69.4 % (ref 42.7–76)
NRBC BLD AUTO-RTO: 0 /100 WBC (ref 0–0.2)
PLATELET # BLD AUTO: 375 10*3/MM3 (ref 140–450)
POTASSIUM SERPL-SCNC: 3.4 MMOL/L (ref 3.5–5.2)
PROT SERPL-MCNC: 6.6 G/DL (ref 6–8.5)
RBC # BLD AUTO: 4.88 10*6/MM3 (ref 3.77–5.28)
SODIUM SERPL-SCNC: 141 MMOL/L (ref 136–145)
TIBC SERPL-MCNC: 323 MCG/DL
TRIGL SERPL-MCNC: 275 MG/DL (ref 0–150)
UIBC SERPL-MCNC: 175 MCG/DL (ref 112–346)
VLDLC SERPL CALC-MCNC: 51 MG/DL (ref 5–40)
WBC # BLD AUTO: 9.9 10*3/MM3 (ref 3.4–10.8)

## 2025-02-27 RX ORDER — VENLAFAXINE 75 MG/1
150 TABLET ORAL 2 TIMES DAILY
Qty: 180 TABLET | Refills: 3 | Status: SHIPPED | OUTPATIENT
Start: 2025-02-27

## 2025-02-27 RX ORDER — POLYETHYLENE GLYCOL 3350 17 G/17G
17 POWDER, FOR SOLUTION ORAL DAILY
COMMUNITY

## 2025-02-27 RX ORDER — OXYBUTYNIN CHLORIDE 15 MG/1
1 TABLET, EXTENDED RELEASE ORAL DAILY
COMMUNITY
Start: 2025-01-20

## 2025-02-27 RX ORDER — DILTIAZEM HYDROCHLORIDE 120 MG/1
120 CAPSULE, EXTENDED RELEASE ORAL DAILY
Qty: 90 CAPSULE | Refills: 3 | Status: SHIPPED | OUTPATIENT
Start: 2025-02-27

## 2025-02-27 NOTE — PROGRESS NOTES
Chief Complaint  Establish Care (Last PCP 12/2024 with [facility] - Spina Bifida ), GI Problem (Neurogenic bladder and bowel - would like a referral ), Foot Pain (Rt sore on foot x1 month - Referral to podiatrist ), and DME (Pw from [facility] )     Subjective:      History of Present Illness {CC  Problem List  Visit  Diagnosis   Encounters  Notes  Medications  Labs  Result Review Imaging  Media :23}     Rehana Cruz presents to [facility] for:    Patient or patient representative verbalized consent for the use of Ambient Listening during the visit with  LEFTY Mcdermott for chart documentation. 3/12/2025  10:52 EST     History of Present Illness      The patient presents to establish care.    She has been diagnosed with neurogenic bladder and bowel, managed by Dr. Benson at [facility]. She was previously under the care of Dr. De La Rosa. A recommendation for a gastroenterologist was made, but no follow-up occurred. She reports changes in her bowel habits since turning 41 and is seeking a referral to a gastroenterologist. Despite dietary modifications and scheduling, she continues to experience accidents. She has not consulted a gastroenterologist as an adult.     She is currently under the care of a urologist and gynecologist, Dr. Corrie Hill. She is also on Bactrim and Ditropan, prescribed by Dr. Benson.     She has been on Bactrim since childhood, which she reports helps with infections and fever management. She contracted COVID-19 in 2021, during which she experienced a fever for the first time in 10 years.     She has made significant dietary improvements since then. She is currently taking venlafaxine and indapamide. She is requesting refills for these medications. She is switching from [facility] to [facility] pharmacy due to limited operating days at [facility].    She has circulation issues in her feet, resulting in abnormal appearance and toenail  conditions. She props her feet up several times a day to improve blood flow and occasionally wears compression socks. She has developed a new sore on her foot, present for about a month, which is not healing. She has consulted a vascular specialist who provided compression socks and diagnosed her with venostasis. She occasionally wears impractical shoes for short periods when going out. She sleeps with socks on and notes that her feet usually return to their normal color after being elevated for half an hour.    She has hemochromatosis, diagnosed 4 years ago, and was under hematology care for a year. She gets her blood drawn every 3 months and has annual testing. Her ferritin level remains around 200. She has had to eliminate many iron-rich foods from her diet.    She saw Dr. Power in November 2024, who noted slightly elevated cholesterol levels and recommended medication, which she declined. She has never had cholesterol problems and wanted to see if she could manage it through dietary changes.    She is interested in discussing weight loss strategies and potential medical interventions at her next visit. She has been attempting weight loss through exercise and believes it would improve the appearance of her feet and legs.    She is due for a mammogram in October 2025 and is seeking a facility with wheelchair-accessible machines. She has worked with a care coordinator during her surgeries.    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    FAMILY HISTORY  Her brother had a heart attack and was diagnosed with hemochromatosis.    MEDICATIONS  Current: Effexor, indapamide, Bactrim, Ditropan     Past Medical History:   Diagnosis Date    Anxiety 2020    COVID     Depression     well managed on current med    Hemochromatosis     followed by [facility]    Hyperlipidemia     Neurogenic bladder     self caths on Bactrim and is followed by urology    Spina bifida     manages ADLs well,  drives and lives alone     Family History   Problem Relation Age of Onset    Ovarian cancer Mother         50's    Cancer Mother     Multiple sclerosis Father     Leukemia Paternal Uncle     Alzheimer's disease Maternal Grandmother     Other Maternal Grandmother         Alzheimer’s    Leukemia Maternal Grandfather     Hemochromatosis Brother      Social History     Tobacco Use   Smoking Status Some Days    Current packs/day: 0.50    Average packs/day: 0.5 packs/day for 20.0 years (10.0 ttl pk-yrs)    Types: Cigarettes   Smokeless Tobacco Never     Social History     Substance and Sexual Activity   Alcohol Use Yes    Alcohol/week: 4.0 standard drinks of alcohol    Types: 2 Glasses of wine, 2 Drinks containing 0.5 oz of alcohol per week    Comment: 3/mo         I have reviewed patient's medical history, any new submitted information provided by patient or medical assistant and updated medical record.      Objective:      Physical Exam  Vitals reviewed.   Constitutional:       General: She is awake. She is not in acute distress.     Appearance: Normal appearance. She is well-groomed and overweight. She is not ill-appearing, toxic-appearing or diaphoretic.   HENT:      Head: Normocephalic.      Right Ear: Hearing normal.      Left Ear: Hearing normal.      Nose: Nose normal.      Mouth/Throat:      Lips: Pink.      Mouth: Mucous membranes are moist.   Eyes:      General: Lids are normal. Vision grossly intact.      Conjunctiva/sclera: Conjunctivae normal.   Cardiovascular:      Rate and Rhythm: Normal rate and regular rhythm.      Pulses: Decreased pulses.           Dorsalis pedis pulses are 1+ on the right side and 1+ on the left side.        Posterior tibial pulses are 1+ on the right side and 1+ on the left side.      Heart sounds: Normal heart sounds.   Pulmonary:      Effort: Pulmonary effort is normal.   Musculoskeletal:      Cervical back: Neck supple.      Right lower leg: 3+ Edema (baseline) present.      Left  lower leg: 3+ Edema present.   Feet:      Right foot:      Skin integrity: Skin breakdown, callus, dry skin and fissure present. No warmth.      Toenail Condition: Right toenails are abnormally thick and ingrown. Fungal disease present.     Left foot:      Skin integrity: Skin breakdown, callus, dry skin and fissure present. No warmth.      Toenail Condition: Left toenails are abnormally thick and ingrown. Fungal disease present.  Skin:     General: Skin is warm and dry.      Capillary Refill: Capillary refill takes less than 2 seconds.   Neurological:      General: No focal deficit present.      Mental Status: She is alert and oriented to person, place, and time. Mental status is at baseline.      Motor: Weakness, atrophy and abnormal muscle tone present.      Gait: Gait abnormal (wheelchair baseline).   Psychiatric:         Attention and Perception: Attention and perception normal.         Mood and Affect: Mood and affect normal.         Speech: Speech normal.         Behavior: Behavior normal. Behavior is cooperative.         Cognition and Memory: Cognition and memory normal.         Judgment: Judgment normal.        Result Review  Data Reviewed:{ Labs  Result Review  Imaging  Med Tab  Media :23}     Results  Laboratory Studies  Cholesterol was running a little high. Potassium was low. Hemoglobin was up. Iron levels were good.       The following data was reviewed by: LEFTY Mcdermott on 02/27/2025  CMP          10/18/2024    13:57 2/27/2025    11:24   CMP   Glucose 111  108    BUN 14  15    Creatinine 0.56  0.50    EGFR 113.4  115.9    Sodium 137  141    Potassium 3.0  3.4    Chloride 97  101    Calcium 10.4  9.6    Total Protein 7.1  6.6    Albumin 4.2  4.0    Globulin 2.9  2.6    Total Bilirubin 0.2  0.2    Alkaline Phosphatase 115  106    AST (SGOT) 18  20    ALT (SGPT) 22  18    Albumin/Globulin Ratio 1.4  1.5    BUN/Creatinine Ratio 25.0  30.0      CBC          10/18/2024    13:57 2/27/2025     "11:24   CBC   WBC 11.13  9.90    RBC 5.12  4.88    Hemoglobin 17.3  15.7    Hematocrit 48.5  45.4    MCV 94.7  93.0    MCH 33.8  32.2    MCHC 35.7  34.6    RDW 11.9  11.3    Platelets 353  375      CBC w/diff          10/18/2024    13:57   CBC w/Diff   WBC 11.13    RBC 5.12    Hemoglobin 17.3    Hematocrit 48.5    MCV 94.7    MCH 33.8    MCHC 35.7    RDW 11.9    Platelets 353      Lipid Panel          10/18/2024    13:57 2/27/2025    11:24   Lipid Panel   Total Cholesterol 263  247    Triglycerides 179  275    HDL Cholesterol 46  40    VLDL Cholesterol 33  51    LDL Cholesterol  184  156    LDL/HDL Ratio 3.94                    Vital Signs:   /92 (BP Location: Left arm, Patient Position: Sitting, Cuff Size: Adult)   Pulse 98   Temp 97.9 °F (36.6 °C) (Temporal)   Ht 152.4 cm (60\") Comment: pt reported  Wt 90.7 kg (200 lb) Comment: pt reported  SpO2 96%   BMI 39.06 kg/m²             Requested Prescriptions     Signed Prescriptions Disp Refills    venlafaxine (EFFEXOR) 75 MG tablet 180 tablet 3     Sig: Take 2 tablets by mouth 2 (Two) Times a Day.    dilTIAZem XR (DILACOR XR) 120 MG 24 hr capsule 90 capsule 3     Sig: Take 1 capsule by mouth Daily.       Routine medications provided by this office will also be refilled via pharmacy request.       Current Outpatient Medications:     dilTIAZem XR (DILACOR XR) 120 MG 24 hr capsule, Take 1 capsule by mouth Daily., Disp: 90 capsule, Rfl: 3    fluticasone (FLONASE) 50 MCG/ACT nasal spray, Administer 1 spray into the nostril(s) as directed by provider Daily., Disp: , Rfl:     indapamide (LOZOL) 2.5 MG tablet, Take 1 tablet by mouth Every Morning., Disp: 90 tablet, Rfl: 3    medroxyPROGESTERone Acetate 150 MG/ML suspension prefilled syringe, Inject 1 mL into the appropriate muscle as directed by prescriber Every 3 (Three) Months., Disp: 1 mL, Rfl: 3    melatonin 1 MG tablet, Take 6 tablets by mouth Every Night., Disp: , Rfl:     oxybutynin XL (DITROPAN XL) 15 MG 24 " hr tablet, Take 1 tablet by mouth Daily., Disp: , Rfl:     polyethylene glycol (MiraLax) 17 g packet, Take 17 g by mouth Daily., Disp: , Rfl:     sulfamethoxazole-trimethoprim (BACTRIM,SEPTRA) 400-80 MG tablet, TK 1 T PO HS, Disp: , Rfl:     Turmeric (QC TUMERIC COMPLEX PO), Take  by mouth., Disp: , Rfl:     venlafaxine (EFFEXOR) 75 MG tablet, Take 2 tablets by mouth 2 (Two) Times a Day., Disp: 180 tablet, Rfl: 3    potassium chloride 10 MEQ CR tablet, Take 1 tablet by mouth Daily., Disp: 30 tablet, Rfl: 0     Assessment and Plan:      Assessment and Plan {CC Problem List  Visit Diagnosis  ROS  Review (Popup)  Health Maintenance  Quality  BestPractice  Medications  SmartSets  SnapShot Encounters  Media :23}     Problem List Items Addressed This Visit       Depression    Overview   She is well managed on current meds and reports no signs or symptoms of self harm, suicidal ideation. This medication helps with daily life and relationships. Will refill as needed and advised 6 month follow up.  Venlafaxine 75 mg a day.         Relevant Medications    venlafaxine (EFFEXOR) 75 MG tablet    Neurogenic bladder    Overview   self caths on Bactrim and is followed by urology         Relevant Orders    Ambulatory Referral to Case Management Care Coordination, Preventative Care    Hereditary hemochromatosis    Overview   She has chronic hereditary hemochromatosis.  She is followed by [facility].  She occasionally requires blood donation to help lower her ferritin levels.         Relevant Orders    Iron and TIBC (Completed)    Ferritin (Completed)    Primary hypertension    Overview   She is now well controlled on indapamide 2.5 mg a day and diltiazem  mg a day. She notices no side effects. She is willing to continue this dose and will f/u with me in one month.          Relevant Medications    dilTIAZem XR (DILACOR XR) 120 MG 24 hr capsule    Other Relevant Orders    CBC & Differential (Completed)    Comprehensive  Metabolic Panel (Completed)    Spina bifida of lumbosacral region without hydrocephalus    Relevant Orders    Ambulatory Referral to Case Management Care Coordination, Preventative Care    Neurogenic bowel    Relevant Orders    Ambulatory Referral to Gastroenterology    Ambulatory Referral to Case Management Care Coordination, Preventative Care    Statin declined    Body mass index (BMI) of 39.0 to 39.9 in adult    Current Assessment & Plan   Per CDC recommendations each week the average adult needs 150 minutes (30 min workouts 3-5 times a week) of moderate - intensity physical activity and 2 days of muscle strengthening.     Please follow up with GI regarding weight loss medications if they would approve.     Referral to Nutritionist can be placed if needed          Other Visit Diagnoses         Encounter to establish care    -  Primary    Relevant Orders    Ambulatory Referral to Podiatry      Wound of right foot        Relevant Orders    Ambulatory Referral to Podiatry      Mixed hyperlipidemia  (Chronic)       Relevant Orders    Lipid Panel (Completed)                 1. Neurogenic bladder and bowel.  She has been experiencing changes in bowel habits since turning 41 and has not seen a gastroenterologist as an adult. A referral to a gastroenterologist will be made for further evaluation and management. She is advised to discuss the possibility of starting medical weight loss with the gastroenterologist, as it may impact her gut function.    2. Foot sores.  She has a new sore on her foot that is about a month old and is not healing. There is no drainage or active wound. A referral to podiatry, specifically Dr. Abelardo Lopez at [facility], will be made for further evaluation and management. She is advised to avoid manipulating the sore until seen by the podiatrist. She should continue wearing socks at night to prevent snagging and pulling of the toenail.    3. Hemochromatosis.  She was diagnosed with hemochromatosis  4 years ago and has been managing it with periodic blood draws and dietary modifications. Her iron levels were within normal range during the last check. A comprehensive lab workup, including CBC, CMP, cholesterol, iron, TIBC, and ferritin, will be ordered to monitor her condition.    4. Cholesterol management.  Her cholesterol levels were slightly elevated during the last check in November 2024. She has chosen to manage it through dietary changes rather than medication. A comprehensive lab workup, including CBC, CMP, cholesterol, iron, TIBC, and ferritin, will be ordered to monitor her cholesterol levels.    5. Medication management.  Refills for venlafaxine 75 mg and indapamide will be sent to [facility] pharmacy. She is advised to continue taking Bactrim and Ditropan as prescribed by Dr. Benson.    6. Health maintenance.  She is due for a mammogram in October 2025. Assistance will be provided in finding a mammogram center with wheelchair-accessible machines. An annual physical examination will be scheduled for 10/18/2025.    7. Weight management.  She is advised to discuss the possibility of starting medical weight loss with the gastroenterologist, as it may impact her gut function.     Please complete your lab work today. Following review of results our office will be in contact with you for follow up care, medication changes or further instructions if needed. At that time if we need to schedule a follow up appointment one will be made at the time of the call.    Thank you for allowing us to care for you,  LEFTY Mcdermott    Follow Up {Instructions Charge Capture  Follow-up Communications :23}     Return in about 8 months (around 10/20/2025) for Annual physical.      Patient was given instructions and counseling regarding her condition or for health maintenance advice. Please see specific information pulled into the AVS if appropriate.        Dragon disclaimer:   Much of this encounter note is an  electronic transcription/translation of spoken language to printed text. The electronic translation of spoken language may permit erroneous, or at times, nonsensical words or phrases to be inadvertently transcribed; Although I have reviewed the note for such errors, some may still exist.     Additional Patient Counseling:       There are no Patient Instructions on file for this visit.

## 2025-02-28 ENCOUNTER — REFERRAL TRIAGE (OUTPATIENT)
Dept: CASE MANAGEMENT | Facility: OTHER | Age: 50
End: 2025-02-28
Payer: MEDICARE

## 2025-03-03 ENCOUNTER — TELEPHONE (OUTPATIENT)
Dept: INTERNAL MEDICINE | Facility: CLINIC | Age: 50
End: 2025-03-03
Payer: MEDICARE

## 2025-03-03 ENCOUNTER — TELEPHONE (OUTPATIENT)
Dept: INTERNAL MEDICINE | Facility: CLINIC | Age: 50
End: 2025-03-03

## 2025-03-03 RX ORDER — POTASSIUM CHLORIDE 750 MG/1
10 TABLET, EXTENDED RELEASE ORAL DAILY
Qty: 30 TABLET | Refills: 0 | Status: SHIPPED | OUTPATIENT
Start: 2025-03-03

## 2025-03-03 NOTE — TELEPHONE ENCOUNTER
----- Message from Raisa Gonzales sent at 3/3/2025  7:53 AM EST -----  Ms Cruz,   I have reviewed your labs. Your CBC is normal. Your Potassium is low and I would like to call you in a prescription to your pharmacy to bring those levels up. Your kidney numbers are elevated slightly, please make sure you are staying hydrated well.     Your cholesterol is elevated. LDL goal is under 100. Triglyceride goal is under 150. I recommend following a lower saturated fat and lower refined carbohydrate diet.  Please work on dietary changes such as reducing added sugars, decreasing saturated fats, and increase foods with omega-3 fatty acids. Also, engaging in 30 minutes of exercise 5 times per week can improve your cholesterol.    We will repeat your lab work in three months. Please make an appointment today for   On or after May 27 th,25. Please come fasting for that appointment as well.     Please let us know if you have any further questions or concerns.  Thank you for allowing us to care for you,  LEFTY Mcdermott

## 2025-03-03 NOTE — PROGRESS NOTES
Ms Cruz,   I have reviewed your labs. Your CBC is normal. Your Potassium is low and I would like to call you in a prescription to your pharmacy to bring those levels up. Your kidney numbers are elevated slightly, please make sure you are staying hydrated well.     Your cholesterol is elevated. LDL goal is under 100. Triglyceride goal is under 150. I recommend following a lower saturated fat and lower refined carbohydrate diet.  Please work on dietary changes such as reducing added sugars, decreasing saturated fats, and increase foods with omega-3 fatty acids. Also, engaging in 30 minutes of exercise 5 times per week can improve your cholesterol.    We will repeat your lab work in three months. Please make an appointment today for   On or after May 27 th,25. Please come fasting for that appointment as well.     Please let us know if you have any further questions or concerns.  Thank you for allowing us to care for you,  LEFTY Mcdermott

## 2025-03-03 NOTE — TELEPHONE ENCOUNTER
Caller: [facility]ES [employer]    Relationship: Other    Best call back number: [phone]     What was the call regarding: PLEASE REACH OUT TO MILES AT [facility] TO ADVISE IF THE FAX FOR WHEELCHAIR REPAIR HAS BEEN RECEIVED.

## 2025-03-04 ENCOUNTER — TELEPHONE (OUTPATIENT)
Dept: CASE MANAGEMENT | Facility: OTHER | Age: 50
End: 2025-03-04
Payer: MEDICARE

## 2025-03-04 NOTE — TELEPHONE ENCOUNTER
Please call her and see what paperwork she is referring to.  I was not given paperwork during our appointment.

## 2025-03-05 ENCOUNTER — TELEPHONE (OUTPATIENT)
Dept: CASE MANAGEMENT | Facility: OTHER | Age: 50
End: 2025-03-05
Payer: MEDICARE

## 2025-03-05 NOTE — TELEPHONE ENCOUNTER
Called Keke and siddhartha that we have not received any paperwork regarding wheelchair repair and provided fax number and phone number on vm

## 2025-03-07 ENCOUNTER — TELEPHONE (OUTPATIENT)
Dept: CASE MANAGEMENT | Facility: OTHER | Age: 50
End: 2025-03-07
Payer: MEDICARE

## 2025-03-07 NOTE — TELEPHONE ENCOUNTER
Third attempt to call patient, no answer. Left voice mail to call back. Unable to reach patient, will close program per ACM algorithm.

## 2025-03-12 ENCOUNTER — OFFICE VISIT (OUTPATIENT)
Age: 50
End: 2025-03-12
Payer: MEDICARE

## 2025-03-12 VITALS — HEIGHT: 60 IN | BODY MASS INDEX: 39.27 KG/M2 | HEART RATE: 98 BPM | WEIGHT: 200 LBS | OXYGEN SATURATION: 95 %

## 2025-03-12 DIAGNOSIS — G63 POLYNEUROPATHY ASSOCIATED WITH UNDERLYING DISEASE: ICD-10-CM

## 2025-03-12 DIAGNOSIS — B35.1 ONYCHOMYCOSIS: ICD-10-CM

## 2025-03-12 DIAGNOSIS — I87.8 VENOUS CONGESTION: ICD-10-CM

## 2025-03-12 DIAGNOSIS — Q05.7 SPINA BIFIDA OF LUMBOSACRAL REGION WITHOUT HYDROCEPHALUS: Primary | ICD-10-CM

## 2025-03-12 DIAGNOSIS — I89.0 LYMPHEDEMA: ICD-10-CM

## 2025-03-12 PROBLEM — Z53.20 STATIN DECLINED: Status: ACTIVE | Noted: 2025-03-12

## 2025-03-12 NOTE — PROGRESS NOTES
03/12/2025  Foot and Ankle Surgery - New Patient   Provider: Dr. Nacho Mcgrath DPM  Location: [facility]    Subjective:  Rehana Cruz is a 49 y.o. female.     Chief Complaint   Patient presents with    Left Foot - Initial Evaluation     Spina bifida - wears braces AFO    Right Foot - Initial Evaluation     Spina bifida - purple toes - wears compression    Initial Evaluation     PCP: Raisa Gonzales APRN  Last PCP Visit: 2/27/25       49-year-old female presents to clinic for preventative foot exam.  Patient has spina bifida requiring wheelchair ambulation.  Patient wears rigid AFO braces bilaterally with casual shoes.  Patient also wearing compression stockings.  Patient states she has had superficial ulcerations in the past typically with wearing too tight of shoes.  Patient also concerned about nail appearance to right foot.  Nails are thick or brittle discolored.  Patient does use lower extremities for transfer but is nonambulatory.         Allergies   Allergen Reactions    Latex Rash       Past Medical History:   Diagnosis Date    Anxiety 2020    COVID     Depression     well managed on current med    Hemochromatosis     followed by [facility]    Hyperlipidemia     Neurogenic bladder     self caths on Bactrim and is followed by urology    Spina bifida     manages ADLs well, drives and lives alone       Past Surgical History:   Procedure Laterality Date    SKIN GRAFT Right     Buttock       Family History   Problem Relation Age of Onset    Ovarian cancer Mother         50's    Cancer Mother     Multiple sclerosis Father     Leukemia Paternal Uncle     Alzheimer's disease Maternal Grandmother     Other Maternal Grandmother         Alzheimer’s    Leukemia Maternal Grandfather     Hemochromatosis Brother        Social History     Socioeconomic History    Marital status: Single    Years of education: College   Tobacco Use    Smoking status: Some Days     Current packs/day: 0.50     Average packs/day:  "0.5 packs/day for 20.0 years (10.0 ttl pk-yrs)     Types: Cigarettes    Smokeless tobacco: Never   Vaping Use    Vaping status: Never Used   Substance and Sexual Activity    Alcohol use: Yes     Alcohol/week: 4.0 standard drinks of alcohol     Types: 2 Glasses of wine, 2 Drinks containing 0.5 oz of alcohol per week     Comment: 3/mo    Drug use: No    Sexual activity: Not Currently     Partners: Male     Birth control/protection: Injection        Current Outpatient Medications on File Prior to Visit   Medication Sig Dispense Refill    dilTIAZem XR (DILACOR XR) 120 MG 24 hr capsule Take 1 capsule by mouth Daily. 90 capsule 3    fluticasone (FLONASE) 50 MCG/ACT nasal spray Administer 1 spray into the nostril(s) as directed by provider Daily.      indapamide (LOZOL) 2.5 MG tablet Take 1 tablet by mouth Every Morning. 90 tablet 3    medroxyPROGESTERone Acetate 150 MG/ML suspension prefilled syringe Inject 1 mL into the appropriate muscle as directed by prescriber Every 3 (Three) Months. 1 mL 3    melatonin 1 MG tablet Take 6 tablets by mouth Every Night.      oxybutynin XL (DITROPAN XL) 15 MG 24 hr tablet Take 1 tablet by mouth Daily.      polyethylene glycol (MiraLax) 17 g packet Take 17 g by mouth Daily.      potassium chloride 10 MEQ CR tablet Take 1 tablet by mouth Daily. 30 tablet 0    sulfamethoxazole-trimethoprim (BACTRIM,SEPTRA) 400-80 MG tablet TK 1 T PO HS      Turmeric (QC TUMERIC COMPLEX PO) Take  by mouth.      venlafaxine (EFFEXOR) 75 MG tablet Take 2 tablets by mouth 2 (Two) Times a Day. 180 tablet 3     No current facility-administered medications on file prior to visit.         Objective   Pulse 98   Ht 152.4 cm (60\")   Wt 90.7 kg (200 lb)   SpO2 95%   BMI 39.06 kg/m²     Foot/Ankle Exam    GENERAL  Appearance:  appears stated age  Orientation:  AAOx3  Affect:  appropriate  Gait:  non-weight bearing  Assistance:  wheelchair  Right shoe gear: casual shoe (Rigid AFO)  Left shoe gear: casual shoe " (Rigid AFO)    VASCULAR     Right Foot Vascularity   Dorsalis pedis:  2+  Posterior tibial:  2+  Skin temperature:  cool  Edema gradin+  CFT:  3  Pedal hair growth:  Absent  Varicosities:  mild varicosities     Left Foot Vascularity   Dorsalis pedis:  2+  Posterior tibial:  2+  Skin temperature:  cool  Edema gradin+  CFT:  3  Pedal hair growth:  Absent  Varicosities:  mild varicosities     NEUROLOGIC     Right Foot Neurologic   Light touch sensation: diminished  Vibratory sensation: normal  Hot/Cold sensation: normal  Protective Sensation using Carlisle-Femi Monofilament:   Sites intact: 4  Sites tested: 10  Normal reflexes    Achilles reflex:  2+  Babinski reflex:  2+     Left Foot Neurologic   Light touch sensation: diminished  Vibratory sensation: normal  Hot/Cold sensation:  normal  Protective Sensation using Carlisle-Femi Monofilament:   Sites intact: 4  Sites tested: 10  Normal reflexes    Achilles reflex:  2+  Babinski reflex:  2+    MUSCLE STRENGTH     Right Foot Muscle Strength   Foot dorsiflexion:  1  Foot plantar flexion:  1  Foot inversion:  1  Foot eversion:  1     Left Foot Muscle Strength   Foot dorsiflexion:  1  Foot plantar flexion:  1  Foot inversion:  1  Foot eversion:  1    RANGE OF MOTION     Right Foot Range of Motion   Ankle dorsiflexion: decreased   Ankle plantar flexion: decreased   Foot eversion:  decreased   Foot inversion:  decreased   1st MTP extension:  decreased   1st MTP flexion:  decreased   1st IP extension:  decreased   1st IP flexion:  decreased   2nd MTP extension:  decreased   2nd MTP flexion:  decreased   3rd MTP extension:  decreased   3rd MTP flexion:  decreased   4th MTP extension:  decreased   4th MTP flexion:  decreased   5th MTP extension:  decreased   5th MTP flexion:  decreased      Left Foot Range of Motion   Ankle dorsiflexion: decreased  Ankle plantar flexion: decreased  Foot eversion: decreased  Foot inversion: decreased  1st MTP extension:  decreased  1st MTP flexion: decreased  1st IP extension: decreased  1st IP flexion: decreased  2nd MTP extension: decreased  2nd MTP flexion: decreased  3rd MTP extension: decreased  3rd MTP flexion: decreased  4th MTP extension: decreased  4th MTP flexion: decreased  5th MTP extension: decreased  5th MTP flexion: decreased    DERMATOLOGIC      Right Foot Dermatologic   Skin  Positive for abnormal color. (Superficial abrasions to medial and lateral foot.)  Nails  1.  Normal. Positive for onychomycosis, abnormal thickness, subungual debris and dystrophic nail.  2.  Normal. Positive for elongated, onychomycosis, abnormal thickness, subungual debris and dystrophic nail.  3.  Normal. Positive for elongated, onychomycosis, abnormal thickness, subungual debris and dystrophic nail.  4.  Normal. Positive for elongated, onychomycosis, abnormal thickness, subungual debris and dystrophic nail.  5.  Normal. Positive for elongated, onychomycosis, abnormal thickness, subungual debris and dystrophic nail.     Left Foot Dermatologic   Skin  Positive for abnormal color. (Superficial abrasions to medial and lateral foot.)   Nails  1.  Normal. Positive for onychomycosis, abnormal thickness, subungual debris and dystrophic nail.  2.  Normal. Positive for elongated, abnormal thickness and dystrophic nail.  3.  Normal. Positive for elongated and abnormal thickness.  4.  Normal. Positive for elongated, abnormally thick and dystrophic nail.  5.  Normal. Positive for elongated and abnormally thick.        Assessment & Plan   Diagnoses and all orders for this visit:    1. Spina bifida of lumbosacral region without hydrocephalus (Primary)    2. Lymphedema    3. Venous congestion    4. Onychomycosis    5. Polyneuropathy associated with underlying disease       Diagnosis treatment options with patient's spina bifida discussed with the patient.  Patient currently wearing rigid AFOs to bilateral lower extremities.  Patient could benefit from a  wider toebox more flexible shoe.  I believe this is contributing to her superficial ulcerations to bilateral feet.  Patient would benefit from a [place]son/ASICS style shoe.    Patient to continue callus care with urea cream, pumice stone, moisturizing cream to left plantar heel.    Discussed with the patient concerning their lower extremity lymphedema.  Treatment options discussed including exercise, manual lymphatic drainage, use of intermittent pneumatic compression pumps, compression stockings, and good skin care.  Patient to purchase compression stockings ranging from 15-20 mmHg.  Patient to elevate affected extremity daily.    Nail debridement: Both feet x10    Consent and time out was performed before proceeding with the procedure. Nails were debrided with a nail nipper without complication.  No anesthesia was required.  Indications for procedure were thickened, dystrophic, and fungal appearing nails which are difficult to trim.  Proper self-care and technique was discussed with patient.  Patient was stable after procedure.    Patient to follow-up in 3 months.    Reviewed proper basic stretching and manual therapy exercises along with appropriate shoes and activity.  Discussed proper use and/or avoidance of OTC anti-inflammatories.  Patient is to call with any additional issues or concerns.  Greater than 30 minutes was spent before, during, and after evaluation for patient care.    I spent 3 minutes on the separately reported service of nail debridement. This time is not included in the time used to support the E/M service also reported today.           Procedures     Alex Mcgrath DPM

## 2025-03-13 NOTE — ASSESSMENT & PLAN NOTE
Per CDC recommendations each week the average adult needs 150 minutes (30 min workouts 3-5 times a week) of moderate - intensity physical activity and 2 days of muscle strengthening.     Please follow up with GI regarding weight loss medications if they would approve.     Referral to Nutritionist can be placed if needed

## 2025-03-24 ENCOUNTER — OFFICE VISIT (OUTPATIENT)
Dept: INTERNAL MEDICINE | Facility: CLINIC | Age: 50
End: 2025-03-24
Payer: MEDICARE

## 2025-03-24 VITALS
TEMPERATURE: 97.1 F | HEART RATE: 104 BPM | BODY MASS INDEX: 39.06 KG/M2 | SYSTOLIC BLOOD PRESSURE: 148 MMHG | DIASTOLIC BLOOD PRESSURE: 98 MMHG | OXYGEN SATURATION: 96 % | WEIGHT: 200 LBS

## 2025-03-24 DIAGNOSIS — I10 PRIMARY HYPERTENSION: Chronic | ICD-10-CM

## 2025-03-24 DIAGNOSIS — N39.0 URINARY TRACT INFECTION WITHOUT HEMATURIA, SITE UNSPECIFIED: Chronic | ICD-10-CM

## 2025-03-24 DIAGNOSIS — Z74.09 MUSCULOSKELETAL IMMOBILITY: ICD-10-CM

## 2025-03-24 DIAGNOSIS — Q05.7 SPINA BIFIDA OF LUMBOSACRAL REGION WITHOUT HYDROCEPHALUS: Chronic | ICD-10-CM

## 2025-03-24 DIAGNOSIS — R09.89 POOR PERIPHERAL CIRCULATION: Primary | Chronic | ICD-10-CM

## 2025-03-24 PROBLEM — E66.01 MORBIDLY OBESE: Status: RESOLVED | Noted: 2018-10-26 | Resolved: 2025-03-24

## 2025-03-24 NOTE — PROGRESS NOTES
Chief Complaint  DME ([facility] )     Subjective:      History of Present Illness {CC  Problem List  Visit  Diagnosis   Encounters  Notes  Medications  Labs  Result Review Imaging  Media :23}     Rehana Cruz presents to [facility] for:    Patient or patient representative verbalized consent for the use of Ambient Listening during the visit with  LEFTY Mcdermott for chart documentation. 4/6/2025  09:34 EDT     History of Present Illness      Ms Cruz is a [facility]a patient who presents for discussion of a stand up frame walker for her spina bifida, review her hypertension, and urinary tract infection.    She is currently seeking a standing frame to enhance her circulation and help improve her bone density. She has previously utilized a standing frame during physical therapy sessions. Despite being able to stand, she lacks a safe mechanism to do so such as a standing frame at home. She wishes to acquire a standing frame due to her confinement of her wheel chair at this current time. She is hoping to improve circulation to her lower extremities, increase overall movement to help decrease weight and improve overall health.     She is also in the process of obtaining a new wheelchair cushion, as the current one has developed a hole increasing her risk of developing a pressure wound. The VICAIR cushion was initially prescribed 20 years ago following the development of severe pressure sores. She plans to visit a rehabilitation center to explore various options for indoor frames.     She has been advised by her podiatrist to acquire new compression socks and to replace her current tennis shoes, which are causing toe constriction. She has already purchased more flexible shoes and intends to wear the compression socks upon returning home. Her compression and circulation will drastically improve with a standing frame. She has no active wounds at this present time and  ideally with better profusion and circulation future wounds can be prevented. Her previous primary care physician was Dr. Rogelio De La Rosa, and she has been diligent in updating her medical records following any specialist consultations.    She is currently experiencing a urinary tract infection (UTI), which she suspects may be contributing to her elevated blood pressure. She is under the care of Dr. Benson at [facility] and is considering a referral to [facility] for their specialized urology services for individuals with spina bifida. She is on Augmentin, which she will complete on Wednesday, having started the course on Friday. She also takes Bactrim daily but has been advised against concurrent use with Augmentin.    She took her antihypertensive medications approximately 30 minutes prior to the visit.    MEDICATIONS  Current: Amoxicillin, Bactrim    IMMUNIZATIONS  She has received her COVID-19 vaccines.     Past Medical History:   Diagnosis Date    Anxiety 2020    COVID     Depression     well managed on current med    Hemochromatosis     followed by [facility]    Hyperlipidemia     Hypertension 2013    Neurogenic bladder     self caths on Bactrim and is followed by urology    Spina bifida     manages ADLs well, drives and lives alone     Family History   Problem Relation Age of Onset    Ovarian cancer Mother         50's    Cancer Mother     Multiple sclerosis Father     Leukemia Paternal Uncle     Alzheimer's disease Maternal Grandmother     Other Maternal Grandmother         Alzheimer’s    Leukemia Maternal Grandfather     Hemochromatosis Brother      Social History     Tobacco Use   Smoking Status Some Days    Current packs/day: 0.50    Average packs/day: 0.5 packs/day for 20.0 years (10.0 ttl pk-yrs)    Types: Cigarettes   Smokeless Tobacco Never     Social History     Substance and Sexual Activity   Alcohol Use Yes    Alcohol/week: 4.0 standard drinks of alcohol    Types: 2 Glasses of wine, 2  Drinks containing 0.5 oz of alcohol per week    Comment: 3/mo         I have reviewed patient's medical history, any new submitted information provided by patient or medical assistant and updated medical record.      Objective:      Physical Exam  Vitals reviewed.   Constitutional:       General: She is awake. She is not in acute distress.     Appearance: She is well-groomed. She is obese. She is not ill-appearing, toxic-appearing or diaphoretic.   HENT:      Head: Normocephalic.      Right Ear: Hearing normal.      Left Ear: Hearing normal.      Nose: Nose normal.      Mouth/Throat:      Lips: Pink.      Mouth: Mucous membranes are moist.   Eyes:      General: Lids are normal. Vision grossly intact.      Conjunctiva/sclera: Conjunctivae normal.   Cardiovascular:      Rate and Rhythm: Normal rate and regular rhythm.      Pulses:           Radial pulses are 2+ on the right side and 2+ on the left side.        Dorsalis pedis pulses are 1+ on the right side and 1+ on the left side.      Heart sounds: Normal heart sounds.   Pulmonary:      Effort: Pulmonary effort is normal.      Breath sounds: Normal breath sounds.   Abdominal:      General: Abdomen is protuberant. Bowel sounds are normal.   Musculoskeletal:      Cervical back: Neck supple. Decreased range of motion.      Thoracic back: Deformity present. Decreased range of motion.      Lumbar back: Deformity present. Decreased range of motion.      Right upper leg: Deformity present.      Left upper leg: Deformity present.      Right knee: Decreased range of motion.      Left knee: Decreased range of motion.      Right lower leg: Deformity present. Edema present.      Left lower leg: Deformity present. Edema present.      Right ankle: Deformity present. Decreased range of motion. Normal pulse.      Left ankle: Deformity present. Decreased range of motion. Normal pulse.      Right foot: Decreased range of motion and decreased capillary refill. Swelling and deformity  present. Abnormal pulse.      Left foot: Decreased range of motion and decreased capillary refill. Swelling and deformity present. Abnormal pulse.   Feet:      Right foot:      Skin integrity: Callus and dry skin present. No skin breakdown.      Toenail Condition: Right toenails are abnormally thick.      Left foot:      Skin integrity: Callus and dry skin present. No skin breakdown.      Toenail Condition: Left toenails are abnormally thick.   Skin:     General: Skin is warm and dry.      Capillary Refill: Capillary refill takes more than 3 seconds.   Neurological:      General: No focal deficit present.      Mental Status: She is alert and oriented to person, place, and time. Mental status is at baseline.      Motor: Weakness, atrophy and abnormal muscle tone present.      Gait: Gait abnormal (wheelchair bound at this time since childhood).   Psychiatric:         Attention and Perception: Attention and perception normal.         Mood and Affect: Mood and affect normal.         Speech: Speech normal.         Behavior: Behavior normal. Behavior is cooperative.         Cognition and Memory: Cognition and memory normal.         Judgment: Judgment normal.        Result Review  Data Reviewed:{ Labs  Result Review  Imaging  Med Tab  Media :23}     Results                    Vital Signs:   /98 (BP Location: Left arm, Patient Position: Sitting, Cuff Size: Adult)   Pulse 104   Temp 97.1 °F (36.2 °C) (Temporal)   Wt 90.7 kg (200 lb) Comment: pt reported  SpO2 96%   BMI 39.06 kg/m²                   Requested Prescriptions      No prescriptions requested or ordered in this encounter       Routine medications provided by this office will also be refilled via pharmacy request.       Current Outpatient Medications:     amoxicillin-clavulanate (AUGMENTIN) 875-125 MG per tablet, , Disp: , Rfl:     dilTIAZem XR (DILACOR XR) 120 MG 24 hr capsule, Take 1 capsule by mouth Daily., Disp: 90 capsule, Rfl: 3    fluticasone  (FLONASE) 50 MCG/ACT nasal spray, Administer 1 spray into the nostril(s) as directed by provider Daily., Disp: , Rfl:     indapamide (LOZOL) 2.5 MG tablet, Take 1 tablet by mouth Every Morning., Disp: 90 tablet, Rfl: 3    medroxyPROGESTERone Acetate 150 MG/ML suspension prefilled syringe, Inject 1 mL into the appropriate muscle as directed by prescriber Every 3 (Three) Months., Disp: 1 mL, Rfl: 3    melatonin 1 MG tablet, Take 6 tablets by mouth Every Night., Disp: , Rfl:     oxybutynin XL (DITROPAN XL) 15 MG 24 hr tablet, Take 1 tablet by mouth Daily., Disp: , Rfl:     polyethylene glycol (MiraLax) 17 g packet, Take 17 g by mouth Daily., Disp: , Rfl:     sulfamethoxazole-trimethoprim (BACTRIM,SEPTRA) 400-80 MG tablet, TK 1 T PO HS, Disp: , Rfl:     Turmeric (QC TUMERIC COMPLEX PO), Take  by mouth., Disp: , Rfl:     venlafaxine (EFFEXOR) 75 MG tablet, Take 2 tablets by mouth 2 (Two) Times a Day., Disp: 180 tablet, Rfl: 3    potassium chloride 10 MEQ CR tablet, TAKE 1 TABLET BY MOUTH DAILY, Disp: 90 tablet, Rfl: 0     Assessment and Plan:      Assessment and Plan {CC Problem List  Visit Diagnosis  ROS  Review (Popup)  Health Maintenance  Quality  BestPractice  Medications  SmartSets  SnapShot Encounters  Media :23}     Problem List Items Addressed This Visit       Primary hypertension    Overview   She is now well controlled on indapamide 2.5 mg a day and diltiazem  mg a day. She notices no side effects. She is willing to continue this dose and will f/u with me in one month.          Musculoskeletal immobility    Spina bifida of lumbosacral region without hydrocephalus    Body mass index (BMI) of 39.0 to 39.9 in adult    Poor peripheral circulation - Primary     Other Visit Diagnoses         Urinary tract infection without hematuria, site unspecified  (Chronic)       Relevant Medications    amoxicillin-clavulanate (AUGMENTIN) 875-125 MG per tablet                 1. Spina bifida/limitation of  movement   She has been referred to a podiatrist due to foot-related complications. A letter of medical necessity will be provided to facilitate the acquisition of a standing frame. This device is expected to improve her circulation, bone density, mobility, and overall health status. She will also receive a new wheelchair cushion, specifically a VICAIR cushion, to replace her current one which has a hole. This cushion was prescribed years ago due to previous pressure sores.    2. Hypertension.  Her blood pressure is elevated today, potentially due to her current UTI. She took her blood pressure medications about half an hour before the visit. The standing frame is also anticipated to aid in the management of her primary hypertension.    3. Urinary tract infection (UTI).  She is currently taking Augmentin (amoxicillin) for her UTI and will complete the course by Wednesday. She is also taking Bactrim but not concurrently with Augmentin to avoid gastrointestinal side effects.     Thank you for allowing us to care for you,  LEFTY Mcdermott    Follow Up {Instructions Charge Capture  Follow-up Communications :23}     No follow-ups on file.      Patient was given instructions and counseling regarding her condition or for health maintenance advice. Please see specific information pulled into the AVS if appropriate.        Dragon disclaimer:   Much of this encounter note is an electronic transcription/translation of spoken language to printed text. The electronic translation of spoken language may permit erroneous, or at times, nonsensical words or phrases to be inadvertently transcribed; Although I have reviewed the note for such errors, some may still exist.     Additional Patient Counseling:       There are no Patient Instructions on file for this visit.

## 2025-04-01 ENCOUNTER — TELEPHONE (OUTPATIENT)
Dept: INTERNAL MEDICINE | Facility: CLINIC | Age: 50
End: 2025-04-01
Payer: MEDICARE

## 2025-04-01 NOTE — TELEPHONE ENCOUNTER
Called Keke [phone] regarding [employer] fax form that was dated 3/31 and lvm that PCP out until 4/7 and wanted to see if a verbal could be given for RX wheelchair repair. Form placed in PCP folder for signature upon RTC

## 2025-04-03 NOTE — TELEPHONE ENCOUNTER
Rx Refill Note  Requested Prescriptions     Pending Prescriptions Disp Refills    potassium chloride 10 MEQ CR tablet [Pharmacy Med Name: POTASSIUM CHLORIDE ER 10MEQ TABLET] 30 tablet 0     Sig: TAKE 1 TABLET BY MOUTH DAILY      Last office visit with prescribing clinician: 3/24/2025   Last telemedicine visit with prescribing clinician: Visit date not found   Next office visit with prescribing clinician: 10/20/2025

## 2025-04-06 RX ORDER — POTASSIUM CHLORIDE 750 MG/1
10 TABLET, EXTENDED RELEASE ORAL DAILY
Qty: 90 TABLET | Refills: 0 | Status: SHIPPED | OUTPATIENT
Start: 2025-04-06

## 2025-04-07 NOTE — TELEPHONE ENCOUNTER
Called Keke and informed her of Fax form that was faxed back 132p and to call the office if not received.

## 2025-04-15 ENCOUNTER — TELEPHONE (OUTPATIENT)
Dept: INTERNAL MEDICINE | Facility: CLINIC | Age: 50
End: 2025-04-15
Payer: MEDICARE

## 2025-04-15 NOTE — TELEPHONE ENCOUNTER
Called Kayden christianson/Ana Cristina Dove to see if office note was received as well as standing fram fax form that we received.

## 2025-05-13 ENCOUNTER — OFFICE VISIT (OUTPATIENT)
Dept: GASTROENTEROLOGY | Facility: CLINIC | Age: 50
End: 2025-05-13
Payer: MEDICARE

## 2025-05-13 VITALS
TEMPERATURE: 97.3 F | BODY MASS INDEX: 39.27 KG/M2 | DIASTOLIC BLOOD PRESSURE: 85 MMHG | SYSTOLIC BLOOD PRESSURE: 123 MMHG | HEIGHT: 60 IN | WEIGHT: 200 LBS | HEART RATE: 106 BPM

## 2025-05-13 DIAGNOSIS — R19.4 CHANGE IN BOWEL HABITS: Primary | ICD-10-CM

## 2025-05-13 DIAGNOSIS — K59.00 CONSTIPATION, UNSPECIFIED CONSTIPATION TYPE: ICD-10-CM

## 2025-05-13 NOTE — Clinical Note
Please contact patient to schedule colonoscopy with Dr. Velazquez.  Case request is in.  She will need GoLytely prep and 2 days low residue diet prior to procedure.  Thanks BG

## 2025-05-13 NOTE — Clinical Note
New patient with history of spina bifida, neurogenic bowel and bladder with worsening of bowel habits in the last couple of years.  Struggles with chronic constipation on MiraLAX and fiber.  I started her on Linzess.  Negative Cologuard 2023.  I was thinking about scheduling her for colonoscopy as well.  She has never had 1.  Would that be okay with you?

## 2025-05-13 NOTE — PROGRESS NOTES
Chief Complaint   Patient presents with    Constipation       HPI    Rehana Cruz is a  49 y.o. female here to establish care as a new patient for complaints of constipation.    This patient will also follow with Dr. Velazquez.    Past medical history of anxiety, depression, hemochromatosis follows with [facility], hyperlipidemia, hypertension along with spina bifida associated with neurogenic bowel and bladder.  She is struggled with constipation as long as she can remember.  It has been worse over the past couple of years.  She is currently taking MiraLAX and alternating fiber having a bowel movement every 4 days or so.  About once a month she will have to use an enema for complete relief.  No rectal bleeding or rectal pain.  No upper GI complaints.    Negative Cologuard 2023.  No family history of colon cancer.  She has never had a colonoscopy.     Recent hemoglobin and LFTs normal.    Past Medical History:   Diagnosis Date    Anxiety 2020    COVID     Depression     well managed on current med    Hemochromatosis     followed by [facility]    Hyperlipidemia     Hypertension 2013    Neurogenic bladder     self caths on Bactrim and is followed by urology    Spina bifida     manages ADLs well, drives and lives alone       Past Surgical History:   Procedure Laterality Date    COLONOSCOPY  2023    SKIN GRAFT Right     Buttock       Scheduled Meds:     Continuous Infusions: No current facility-administered medications for this visit.      PRN Meds:     Allergies   Allergen Reactions    Latex Rash       Social History     Socioeconomic History    Marital status: Single    Years of education: College   Tobacco Use    Smoking status: Some Days     Current packs/day: 0.50     Average packs/day: 0.5 packs/day for 20.0 years (10.0 ttl pk-yrs)     Types: Cigarettes    Smokeless tobacco: Never   Vaping Use    Vaping status: Never Used   Substance and Sexual Activity    Alcohol use: Yes     Alcohol/week: 4.0 standard  drinks of alcohol     Types: 2 Glasses of wine, 2 Drinks containing 0.5 oz of alcohol per week     Comment: 3/mo    Drug use: Yes     Frequency: 1.0 times per week     Types: Marijuana     Comment: Only occasionally    Sexual activity: Not Currently     Partners: Male     Birth control/protection: Depo-provera, Injection       Family History   Problem Relation Age of Onset    Ovarian cancer Mother         50's    Cancer Mother     Crohn's disease Mother     Irritable bowel syndrome Mother     Multiple sclerosis Father     Leukemia Paternal Uncle     Alzheimer's disease Maternal Grandmother     Other Maternal Grandmother         Alzheimer’s    Leukemia Maternal Grandfather     Hemochromatosis Brother        Review of Systems   Gastrointestinal:  Positive for constipation.       Vitals:    05/13/25 1305   BP: 123/85   Pulse: 106   Temp: 97.3 °F (36.3 °C)       Physical Exam  Constitutional:       Appearance: She is well-developed.   Abdominal:      General: Bowel sounds are normal. There is no distension.      Palpations: Abdomen is soft. There is no mass.      Tenderness: There is no abdominal tenderness. There is no guarding.      Hernia: No hernia is present.   Skin:     General: Skin is warm and dry.      Capillary Refill: Capillary refill takes less than 2 seconds.   Neurological:      Mental Status: She is alert and oriented to person, place, and time.   Psychiatric:         Behavior: Behavior normal.     Assessment    Diagnoses and all orders for this visit:    1. Change in bowel habits (Primary)    2. Constipation, unspecified constipation type    Other orders  -     linaclotide (Linzess) 145 MCG capsule capsule; Take 1 capsule by mouth Every Morning Before Breakfast for 12 days.  Dispense: 12 capsule; Refill: 0    Plan    Schedule colonoscopy with Dr. Velazquez for further evaluation of symptoms  Begin Linzess 145 mcg once daily 30 minutes before meal samples with dosing instructions provided  Hold MiraLAX  while trialing Linzess  Recommend GoLytely prep in 2 days low residue diet prior to procedure to ensure adequate results  Follow-up and further recommendations pending procedural evaluation and response to treatment for constipation         LEFTY Herron  [facility]  [address] [place] [address]  Office: [phone]

## 2025-05-16 PROBLEM — R19.4 CHANGE IN BOWEL HABITS: Status: ACTIVE | Noted: 2025-05-13

## 2025-05-19 ENCOUNTER — TELEPHONE (OUTPATIENT)
Dept: GASTROENTEROLOGY | Facility: CLINIC | Age: 50
End: 2025-05-19
Payer: MEDICARE

## 2025-05-23 DIAGNOSIS — I10 PRIMARY HYPERTENSION: Chronic | ICD-10-CM

## 2025-05-23 RX ORDER — DILTIAZEM HYDROCHLORIDE 120 MG/1
120 CAPSULE, EXTENDED RELEASE ORAL DAILY
Qty: 90 CAPSULE | Refills: 1 | Status: SHIPPED | OUTPATIENT
Start: 2025-05-23

## 2025-07-14 DIAGNOSIS — F32.89 OTHER DEPRESSION: Chronic | ICD-10-CM

## 2025-07-14 RX ORDER — VENLAFAXINE 75 MG/1
150 TABLET ORAL EVERY 12 HOURS SCHEDULED
Qty: 180 TABLET | Refills: 0 | OUTPATIENT
Start: 2025-07-14